# Patient Record
Sex: FEMALE | Race: WHITE | URBAN - METROPOLITAN AREA
[De-identification: names, ages, dates, MRNs, and addresses within clinical notes are randomized per-mention and may not be internally consistent; named-entity substitution may affect disease eponyms.]

---

## 2017-03-01 ENCOUNTER — MEDICAL CORRESPONDENCE (OUTPATIENT)
Dept: HEALTH INFORMATION MANAGEMENT | Facility: CLINIC | Age: 27
End: 2017-03-01

## 2017-03-09 ENCOUNTER — OFFICE VISIT (OUTPATIENT)
Dept: DERMATOLOGY | Facility: CLINIC | Age: 27
End: 2017-03-09
Payer: COMMERCIAL

## 2017-03-09 VITALS — HEART RATE: 78 BPM | SYSTOLIC BLOOD PRESSURE: 133 MMHG | DIASTOLIC BLOOD PRESSURE: 91 MMHG | OXYGEN SATURATION: 97 %

## 2017-03-09 DIAGNOSIS — L70.0 ACNE VULGARIS: Primary | ICD-10-CM

## 2017-03-09 PROCEDURE — 99203 OFFICE O/P NEW LOW 30 MIN: CPT | Performed by: PHYSICIAN ASSISTANT

## 2017-03-09 RX ORDER — ADAPALENE 0.1 G/100G
CREAM TOPICAL
Qty: 45 G | Refills: 11 | Status: SHIPPED | OUTPATIENT
Start: 2017-03-09 | End: 2017-08-09 | Stop reason: ALTCHOICE

## 2017-03-09 RX ORDER — CLINDAMYCIN PHOSPHATE 10 UG/ML
LOTION TOPICAL
Qty: 60 ML | Refills: 11 | Status: SHIPPED | OUTPATIENT
Start: 2017-03-09 | End: 2018-05-01

## 2017-03-09 RX ORDER — DOXYCYCLINE 100 MG/1
1 CAPSULE ORAL
Qty: 90 CAPSULE | Refills: 1 | Status: SHIPPED | OUTPATIENT
Start: 2017-03-09 | End: 2017-04-25

## 2017-03-09 NOTE — NURSING NOTE
"Chief Complaint   Patient presents with     Derm Problem     acne       Initial BP (!) 133/91  Pulse 78  SpO2 97% Estimated body mass index is 23.83 kg/(m^2) as calculated from the following:    Height as of 3/15/16: 1.638 m (5' 4.5\").    Weight as of 3/22/16: 64 kg (141 lb).  BP completed using cuff size: margarita Brito LPN    "

## 2017-03-09 NOTE — PATIENT INSTRUCTIONS
Treating acne is preventative.    May take 3-4 months to see 50% improvement.    May get worse during initial phase of treatment.    differin at bedtime (use pea sized amount to treat entire face) Dryness, irritation can     result, make sure to apply to dry face, and if too drying can use every other day.     Benzoyl peroxide wash daily or every other day depending on dryness.    Aggressive use of bland moisturizer such as Cerave or Cetaphil.    Apply clindamycin lotion twice daily to face.     Doxycycline 100mg twice daily for next two months then decrease once daily. Always take with food to avoid upset stomach, take at    least 30 minutes before you lay down.    These medications will make you Sun sensitive. Use sunscreen with UVA/UVB    protection with and SPF of 30 or above.    Recheck in 3-4 months.

## 2017-03-09 NOTE — MR AVS SNAPSHOT
After Visit Summary   3/9/2017    Vivian De La Torre    MRN: [id number]           Patient Information     Date Of Birth          1990        Visit Information        Provider Department      3/9/2017 3:40 PM Jennifer Hinton PA-C [facility]        Today's Diagnoses     Acne vulgaris    -  1      Care Instructions    Treating acne is preventative.    May take 3-4 months to see 50% improvement.    May get worse during initial phase of treatment.    differin at bedtime (use pea sized amount to treat entire face) Dryness, irritation can     result, make sure to apply to dry face, and if too drying can use every other day.     Benzoyl peroxide wash daily or every other day depending on dryness.    Aggressive use of bland moisturizer such as Cerave or Cetaphil.    Apply clindamycin lotion twice daily to face.     Doxycycline 100mg twice daily for next two months then decrease once daily. Always take with food to avoid upset stomach, take at    least 30 minutes before you lay down.    These medications will make you Sun sensitive. Use sunscreen with UVA/UVB    protection with and SPF of 30 or above.    Recheck in 3-4 months.               Follow-ups after your visit        Who to contact     If you have questions or need follow up information about today's clinic visit or your schedule please contact [facility] directly at [phone].  Normal or non-critical lab and imaging results will be communicated to you by MyChart, letter or phone within 4 business days after the clinic has received the results. If you do not hear from us within 7 days, please contact the clinic through MyChart or phone. If you have a critical or abnormal lab result, we will notify you by phone as soon as possible.  Submit refill requests through [employer] or call your pharmacy and they will forward the refill request to us. Please allow 3 business days for your refill to be completed.           "Additional Information About Your Visit        MyChart Information     [employer] lets you send messages to your doctor, view your test results, renew your prescriptions, schedule appointments and more. To sign up, go to [email or web address] . Click on \"Log in\" on the left side of the screen, which will take you to the Welcome page. Then click on \"Sign up Now\" on the right side of the page.     You will be asked to enter the access code listed below, as well as some personal information. Please follow the directions to create your username and password.     Your access code is: [email or web address]  Expires: 2017  4:11 PM     Your access code will  in 90 days. If you need help or a new code, please call your [facility] clinic or [phone].        Care EveryWhere ID     This is your Care EveryWhere ID. This could be used by other organizations to access your [facility] medical records  [id number]        Your Vitals Were     Pulse Pulse Oximetry                78 97%           Blood Pressure from Last 3 Encounters:   17 (!) 133/91   16 (!) 140/100   03/15/16 (!) 135/99    Weight from Last 3 Encounters:   16 64 kg (141 lb)   03/15/16 64 kg (141 lb)   05/20/15 72 kg (158 lb 12.8 oz)              Today, you had the following     No orders found for display         Today's Medication Changes          These changes are accurate as of: 3/9/17  4:11 PM.  If you have any questions, ask your nurse or doctor.               Start taking these medicines.        Dose/Directions    adapalene 0.1 % cream   Commonly known as:  DIFFERIN   Used for:  Acne vulgaris   Started by:  Jenniefr Hinton PA-C        Apply pea sized amount at bedtime.   Quantity:  45 g   Refills:  11       clindamycin 1 % lotion   Commonly known as:  CLINDAMAX   Used for:  Acne vulgaris   Started by:  Jennifer Hinton PA-C        Apply twice daily to face.   Quantity:  60 mL   Refills:  11       doxycycline Monohydrate 100 MG " Caps   Used for:  Acne vulgaris   Started by:  Jennifer Hinton PA-C        Dose:  1 capsule   Take 1 capsule (100 mg) by mouth daily with food   Quantity:  90 capsule   Refills:  1         Stop taking these medicines if you haven't already. Please contact your care team if you have questions.     acyclovir 400 MG tablet   Commonly known as:  ZOVIRAX   Stopped by:  Jennifer Hinton PA-C           indomethacin 50 MG capsule   Commonly known as:  INDOCIN   Stopped by:  Jennifer Hinton PA-C           norethindrone-ethinyl estradiol 1-35 MG-MCG per tablet   Commonly known as:  ORTHO-NOVUM 1-35 TAB,NORTREL 1-35 TAB   Stopped by:  Jennifer Hinton PA-C                Where to get your medicines      These medications were sent to [facility] [address]     Phone:  [phone]     adapalene 0.1 % cream    clindamycin 1 % lotion    doxycycline Monohydrate 100 MG Caps                Primary Care Provider    [facility] Other [facility]                Thank you!     Thank you for choosing [facility]  for your care. Our goal is always to provide you with excellent care. Hearing back from our patients is one way we can continue to improve our services. Please take a few minutes to complete the written survey that you may receive in the mail after your visit with us. Thank you!             Your Updated Medication List - Protect others around you: Learn how to safely use, store and throw away your medicines at www.disposemymeds.org.          This list is accurate as of: 3/9/17  4:11 PM.  Always use your most recent med list.                   Brand Name Dispense Instructions for use    adapalene 0.1 % cream    DIFFERIN    45 g    Apply pea sized amount at bedtime.       clindamycin 1 % lotion    CLINDAMAX    60 mL    Apply twice daily to face.       cyclobenzaprine 5 MG tablet    FLEXERIL    42 tablet    Take 1  tablet (5 mg) by mouth 3 times daily as needed for muscle spasms       doxycycline Monohydrate 100 MG Caps     90 capsule    Take 1 capsule (100 mg) by mouth daily with food       NEXPLANON SC

## 2017-03-10 NOTE — PROGRESS NOTES
Vivian De La Torre is a 26 year old year old female patient here today for acne .  Patient states this has been present for few years. Patient notes that she had acne when she was a teenager.  Patient has nexplanon as her birth control. She was recently on minocycline, benzaclin which did help clear her skin however she found the benzaclin was too drying. She denies any flaring with her cycle. She report that she will occasionally get cystic areas. Remainder of the HPI, Meds, PMH, Allergies, FH, and SH was reviewed in chart.    Pertinent Hx:  Acne vulgaris   Past Medical History   Diagnosis Date     ASCUS on Pap smear 8/15/11     HR HPV 66 and HPV 54     Chickenpox      History of colposcopy with cervical biopsy 9/9/11     benign       Past Surgical History   Procedure Laterality Date     No history of surgery          Family History   Problem Relation Age of Onset     Hypertension Father      HEART DISEASE Father      Respiratory Paternal Grandfather      Alcohol/Drug Maternal Grandfather        Social History     Social History     Marital status: Single     Spouse name: N/A     Number of children: N/A     Years of education: N/A     Occupational History     Not on file.     Social History Main Topics     Smoking status: Current Every Day Smoker     Packs/day: 0.10     Last attempt to quit: 2/1/2013     Smokeless tobacco: Never Used      Comment: trying to quit again- 5-20-15     Alcohol use No     Drug use: No     Sexual activity: Yes     Partners: Male     Birth control/ protection: OCP, Pill     Other Topics Concern     Not on file     Social History Narrative       Outpatient Encounter Prescriptions as of 3/9/2017   Medication Sig Dispense Refill     Etonogestrel (NEXPLANON SC)        doxycycline Monohydrate 100 MG CAPS Take 1 capsule (100 mg) by mouth daily with food 90 capsule 1     adapalene (DIFFERIN) 0.1 % cream Apply pea sized amount at bedtime. 45 g 11     clindamycin (CLINDAMAX) 1 % lotion Apply twice  daily to face. 60 mL 11     [DISCONTINUED] norethindrone-ethinyl estradiol (ORTHO-NOVUM 1-35 TAB,NORTREL 1-35 TAB) 1-35 MG-MCG per tablet Take 1 tablet by mouth daily continous for dysmenorrhea 112 tablet 3     [DISCONTINUED] acyclovir (ZOVIRAX) 400 MG tablet Take 1 tablet (400 mg) by mouth 3 times daily Uses only for outbreaks 21 tablet 6     cyclobenzaprine (FLEXERIL) 5 MG tablet Take 1 tablet (5 mg) by mouth 3 times daily as needed for muscle spasms (Patient not taking: Reported on 3/9/2017) 42 tablet 0     [DISCONTINUED] indomethacin (INDOCIN) 50 MG capsule Take 1 capsule (50 mg) by mouth 3 times daily (with meals) 42 capsule 1     No facility-administered encounter medications on file as of 3/9/2017.              Review Of Systems  Skin: As above  Eyes: negative  Ears/Nose/Throat: negative  Respiratory: No shortness of breath, dyspnea on exertion, cough, or hemoptysis  Cardiovascular: negative  Gastrointestinal: negative  Genitourinary: negative  Musculoskeletal: negative  Neurologic: negative  Psychiatric: negative  Hematologic/Lymphatic/Immunologic: negative  Endocrine: negative      O:   NAD, WDWN, Alert & Oriented, Mood & Affect wnl, Vitals stable   Here today alone   BP (!) 133/91  Pulse 78  SpO2 97%   General appearance normal   Vitals stable   Alert, oriented and in no acute distress      Comedones on face, rare inflammatory papules     Eyes: Conjunctivae/lids:Normal     ENT: Lips    MSK:Normal    Pulm: Breathing Normal    Neuro/Psych: Orientation:Normal; Mood/Affect:Normal  A/P:  1. Acne Vulgaris   Treating acne is preventative.    May take 3-4 months to see 50% improvement.    May get worse during initial phase of treatment.    differin at bedtime (use pea sized amount to treat entire face) Dryness, irritation can     result, make sure to apply to dry face, and if too drying can use every other day.     Benzoyl peroxide wash daily or every other day depending on dryness.    Aggressive use of bland  moisturizer such as Cerave or Cetaphil.    Apply clindamycin lotion twice daily to face.     Doxycycline 100mg twice daily for next two months then decrease once daily. Always take with food to avoid upset stomach, take at    least 30 minutes before you lay down.    These medications will make you Sun sensitive. Use sunscreen with UVA/UVB    protection with and SPF of 30 or above.    Recheck in 3-4 months.

## 2017-04-25 ENCOUNTER — TELEPHONE (OUTPATIENT)
Dept: DERMATOLOGY | Facility: CLINIC | Age: 27
End: 2017-04-25

## 2017-04-25 DIAGNOSIS — L70.0 ACNE VULGARIS: ICD-10-CM

## 2017-04-25 RX ORDER — DOXYCYCLINE 100 MG/1
CAPSULE ORAL
Qty: 60 CAPSULE | Refills: 2 | Status: SHIPPED | OUTPATIENT
Start: 2017-04-25 | End: 2017-08-09 | Stop reason: ALTCHOICE

## 2017-04-25 NOTE — TELEPHONE ENCOUNTER
Rx sent to pharmacy to reflect 3-9-17 Dermatology office visit dictation:     Doxycycline 100mg twice daily for next two months then decrease once daily. Always take with food to avoid upset stomach, take at     least 30 minutes before you lay down.    Carmita Ag RN

## 2017-04-25 NOTE — TELEPHONE ENCOUNTER
Reason for Call:  Other prescription    Detailed comments: calling in for new rx to reflect increase in dose per patient request on the doxycycline    Phone Number Patient can be reached at: NA    Best Time: NA    Can we leave a detailed message on this number? Not Applicable    Call taken on 4/25/2017 at 9:29 AM by Renita Mckeon

## 2017-06-28 ENCOUNTER — TELEPHONE (OUTPATIENT)
Dept: DERMATOLOGY | Facility: CLINIC | Age: 27
End: 2017-06-28

## 2017-06-28 DIAGNOSIS — L70.0 ACNE VULGARIS: Primary | ICD-10-CM

## 2017-06-28 NOTE — TELEPHONE ENCOUNTER
"Last seen for Acne 3-9-17 and was to return in 3-4 months. Has no follow up appointment scheduled.     \"I was taking the pills twice a day, then I got switched to once a day, but I am still getting new pimples and my face is oily 24/7. It is not clearing up. Is there something that I can try?..\"     Advised to schedule a follow up appointment as she was to be seen in 3-4 months, but we are booking appointments into 1st week of August, so asked to schedule follow up and will send to Provider for further advice.     Uses [facility] pharmacy.     Carmita Ag RN        "

## 2017-06-28 NOTE — TELEPHONE ENCOUNTER
Reason for Call:  Patient is calling in states that medication regimen is not working    Detailed comments: please advise recommendations    Phone Number Patient can be reached at: Home number on file [phone] (home)    Best Time: any    Can we leave a detailed message on this number? YES    Call taken on 6/28/2017 at 1:46 PM by Renita Mckeon

## 2017-06-28 NOTE — TELEPHONE ENCOUNTER
She really does need a f/u appt to discuss all of the options. I know we are booking far out, so can offer a different antibiotic in the mean time. I would do ampicillin twice per day for the next 1-2 months. I will pend if she would like to try.   Antibiotics are more of a band-aid and don't necessarily cure anything, so even if she is clear, I would still f/u.

## 2017-06-29 RX ORDER — AMPICILLIN TRIHYDRATE 500 MG
CAPSULE ORAL
Qty: 60 CAPSULE | Refills: 1 | Status: SHIPPED | OUTPATIENT
Start: 2017-06-29 | End: 2017-08-09 | Stop reason: ALTCHOICE

## 2017-06-29 NOTE — TELEPHONE ENCOUNTER
Pt returned call and would like to try the ampicillin - please send in prescription.     Should she continue using the cream and lotion she is on, or stop that once she is on the antibiotic? - Please advise

## 2017-06-29 NOTE — TELEPHONE ENCOUNTER
Prescription sent to pharmacy. Please see note below and advise on topicals.  Lissett VICTORIA RN BSN PHN  Specialty Clinics

## 2017-06-29 NOTE — TELEPHONE ENCOUNTER
Left message for pt to call back and advise if she would like to try the ampicillin.  Medication pended per provider. Advised to keep upcoming appt.  Lissett VICTORIA RN BSN PHN  Specialty Clinics

## 2017-08-08 ENCOUNTER — OFFICE VISIT (OUTPATIENT)
Dept: DERMATOLOGY | Facility: CLINIC | Age: 27
End: 2017-08-08
Payer: COMMERCIAL

## 2017-08-08 VITALS — HEART RATE: 80 BPM | OXYGEN SATURATION: 100 % | DIASTOLIC BLOOD PRESSURE: 82 MMHG | SYSTOLIC BLOOD PRESSURE: 129 MMHG

## 2017-08-08 DIAGNOSIS — L70.0 ACNE VULGARIS: Primary | ICD-10-CM

## 2017-08-08 LAB
ANION GAP SERPL CALCULATED.3IONS-SCNC: 4 MMOL/L (ref 3–14)
BUN SERPL-MCNC: 13 MG/DL (ref 7–30)
CALCIUM SERPL-MCNC: 8.9 MG/DL (ref 8.5–10.1)
CHLORIDE SERPL-SCNC: 108 MMOL/L (ref 94–109)
CO2 SERPL-SCNC: 29 MMOL/L (ref 20–32)
CREAT SERPL-MCNC: 0.7 MG/DL (ref 0.52–1.04)
GFR SERPL CREATININE-BSD FRML MDRD: NORMAL ML/MIN/1.7M2
GLUCOSE SERPL-MCNC: 94 MG/DL (ref 70–99)
POTASSIUM SERPL-SCNC: 4 MMOL/L (ref 3.4–5.3)
SODIUM SERPL-SCNC: 141 MMOL/L (ref 133–144)

## 2017-08-08 PROCEDURE — 80048 BASIC METABOLIC PNL TOTAL CA: CPT | Performed by: PHYSICIAN ASSISTANT

## 2017-08-08 PROCEDURE — 36415 COLL VENOUS BLD VENIPUNCTURE: CPT | Performed by: PHYSICIAN ASSISTANT

## 2017-08-08 PROCEDURE — 99213 OFFICE O/P EST LOW 20 MIN: CPT | Performed by: PHYSICIAN ASSISTANT

## 2017-08-08 RX ORDER — TRETINOIN 0.5 MG/G
CREAM TOPICAL
Qty: 45 G | Refills: 11 | Status: SHIPPED | OUTPATIENT
Start: 2017-08-08 | End: 2018-01-15 | Stop reason: ALTCHOICE

## 2017-08-08 RX ORDER — SPIRONOLACTONE 100 MG/1
100 TABLET, FILM COATED ORAL DAILY
Qty: 90 TABLET | Refills: 1 | Status: SHIPPED | OUTPATIENT
Start: 2017-08-08 | End: 2018-01-15 | Stop reason: ALTCHOICE

## 2017-08-08 NOTE — MR AVS SNAPSHOT
"              After Visit Summary   2017    Vivian De La Torre    MRN: [id number]           Patient Information     Date Of Birth          1990        Visit Information        Provider Department      2017 3:40 PM Jennifer Hinton PA-C [facility]        Today's Diagnoses     Acne vulgaris    -  1      Care Instructions    Use benzoyl peroxide 10% wash- OTC  Apply tretinoin 0.05% cream at bedtime.   Check blood work today.   Start Spironolactone 100 mg daily.           Follow-ups after your visit        Who to contact     If you have questions or need follow up information about today's clinic visit or your schedule please contact [facility] directly at [phone].  Normal or non-critical lab and imaging results will be communicated to you by [employer] software[employer], letter or phone within 4 business days after the clinic has received the results. If you do not hear from us within 7 days, please contact the clinic through [employer] software[employer] or phone. If you have a critical or abnormal lab result, we will notify you by phone as soon as possible.  Submit refill requests through [employer] or call your pharmacy and they will forward the refill request to us. Please allow 3 business days for your refill to be completed.          Additional Information About Your Visit        [employer] software[employer] Information     [employer] lets you send messages to your doctor, view your test results, renew your prescriptions, schedule appointments and more. To sign up, go to [email or web address] . Click on \"Log in\" on the left side of the screen, which will take you to the Welcome page. Then click on \"Sign up Now\" on the right side of the page.     You will be asked to enter the access code listed below, as well as some personal information. Please follow the directions to create your username and password.     Your access code is: [email or web address]  Expires: 2017  3:43 PM     Your access code will  in 90 days. If you need " help or a new code, please call your [place] clinic or [phone].        Care EveryWhere ID     This is your Care EveryWhere ID. This could be used by other organizations to access your [facility] medical records  [id number]        Your Vitals Were     Pulse Pulse Oximetry                80 100%           Blood Pressure from Last 3 Encounters:   08/08/17 129/82   03/09/17 (!) 133/91   03/22/16 (!) 140/100    Weight from Last 3 Encounters:   03/22/16 64 kg (141 lb)   03/15/16 64 kg (141 lb)   05/20/15 72 kg (158 lb 12.8 oz)              We Performed the Following     Basic metabolic panel  (Ca, Cl, CO2, Creat, Gluc, K, Na, BUN)          Today's Medication Changes          These changes are accurate as of: 8/8/17  3:43 PM.  If you have any questions, ask your nurse or doctor.               Start taking these medicines.        Dose/Directions    spironolactone 100 MG tablet   Commonly known as:  ALDACTONE   Used for:  Acne vulgaris   Started by:  Jennifer Hinton PA-C        Dose:  100 mg   Take 1 tablet (100 mg) by mouth daily   Quantity:  90 tablet   Refills:  1       tretinoin 0.05 % cream   Commonly known as:  RETIN-A   Used for:  Acne vulgaris   Started by:  Jennifer Hinton PA-C        Spread a pea size amount into affected area topically at bedtime.  Use sunscreen SPF>20.   Quantity:  45 g   Refills:  11            Where to get your medicines      These medications were sent to [facility]-[address] [address]     Phone:  [phone]     spironolactone 100 MG tablet    tretinoin 0.05 % cream                Primary Care Provider    [facility] Other Clinic                Equal Access to Services     RENITA BAJWA AH: Amor Johnston, roger atkinson, lincoln blairalgunjan leyva, pallavi bonilla. So [facility] [phone].    ATENCIÓN: Si habla español, tiene a sykes disposición servicios gratuitos  de asistencia lingüística. Lyssa cardenas [phone].    We comply with applicable federal civil rights laws and [place] laws. We do not discriminate on the basis of race, color, national origin, age, disability sex, sexual orientation or gender identity.            Thank you!     Thank you for choosing [facility]  for your care. Our goal is always to provide you with excellent care. Hearing back from our patients is one way we can continue to improve our services. Please take a few minutes to complete the written survey that you may receive in the mail after your visit with us. Thank you!             Your Updated Medication List - Protect others around you: Learn how to safely use, store and throw away your medicines at www.disposemymeds.org.          This list is accurate as of: 8/8/17  3:43 PM.  Always use your most recent med list.                   Brand Name Dispense Instructions for use Diagnosis    adapalene 0.1 % cream    DIFFERIN    45 g    Apply pea sized amount at bedtime.    Acne vulgaris       ampicillin 500 MG capsule    PRINCIPEN    60 capsule    1 tab PO BID    Acne vulgaris       clindamycin 1 % lotion    CLINDAMAX    60 mL    Apply twice daily to face.    Acne vulgaris       cyclobenzaprine 5 MG tablet    FLEXERIL    42 tablet    Take 1 tablet (5 mg) by mouth 3 times daily as needed for muscle spasms    SI (sacroiliac) joint dysfunction       doxycycline Monohydrate 100 MG Caps     60 capsule    Take 1 capsule twice daily with food for 2 months, then decrease to once daily, Then be seen in 3 months for recheck.    Acne vulgaris       NEXPLANON SC           spironolactone 100 MG tablet    ALDACTONE    90 tablet    Take 1 tablet (100 mg) by mouth daily    Acne vulgaris       tretinoin 0.05 % cream    RETIN-A    45 g    Spread a pea size amount into affected area topically at bedtime.  Use sunscreen SPF>20.    Acne vulgaris

## 2017-08-08 NOTE — PATIENT INSTRUCTIONS
Use benzoyl peroxide 10% wash- OTC  Apply tretinoin 0.05% cream at bedtime.   Check blood work today.   Start Spironolactone 100 mg daily.

## 2017-08-08 NOTE — NURSING NOTE
Chief Complaint   Patient presents with     Acne       Vitals:    08/08/17 1530   BP: 129/82   Pulse: 80   SpO2: 100%     Wt Readings from Last 1 Encounters:   03/22/16 64 kg (141 lb)       Abbie Hatfield LPN.................8/8/2017

## 2017-08-09 NOTE — PROGRESS NOTES
Vivian De La Torre is a 26 year old year old female patient here today for recheck acne.  Patient reports that her skin did not see much improvements on doxycycline twice daily or ampicillin. She is also on clindamycin lotion, bpo wash, and differin. She notes that she will continue have flaring on regimen. She reports she will get cystic areas around jaw line. She has her sister's wedding in September and would like to have clearer skin.  Remainder of the HPI, Meds, PMH, Allergies, FH, and SH was reviewed in chart.   Past Medical History:   Diagnosis Date     ASCUS on Pap smear 8/15/11    HR HPV 66 and HPV 54     Chickenpox      History of colposcopy with cervical biopsy 9/9/11    benign       Past Surgical History:   Procedure Laterality Date     NO HISTORY OF SURGERY          Family History   Problem Relation Age of Onset     Hypertension Father      HEART DISEASE Father      Respiratory Paternal Grandfather      Alcohol/Drug Maternal Grandfather        Social History     Social History     Marital status: Single     Spouse name: N/A     Number of children: N/A     Years of education: N/A     Occupational History     Not on file.     Social History Main Topics     Smoking status: Current Every Day Smoker     Packs/day: 0.10     Last attempt to quit: 2/1/2013     Smokeless tobacco: Never Used      Comment: trying to quit again- 5-20-15     Alcohol use No     Drug use: No     Sexual activity: Yes     Partners: Male     Birth control/ protection: OCP, Pill     Other Topics Concern     Not on file     Social History Narrative       Outpatient Encounter Prescriptions as of 8/8/2017   Medication Sig Dispense Refill     spironolactone (ALDACTONE) 100 MG tablet Take 1 tablet (100 mg) by mouth daily 90 tablet 1     tretinoin (RETIN-A) 0.05 % cream Spread a pea size amount into affected area topically at bedtime.  Use sunscreen SPF>20. 45 g 11     ampicillin (PRINCIPEN) 500 MG capsule 1 tab PO BID 60 capsule 1      Etonogestrel (NEXPLANON SC)        adapalene (DIFFERIN) 0.1 % cream Apply pea sized amount at bedtime. 45 g 11     clindamycin (CLINDAMAX) 1 % lotion Apply twice daily to face. 60 mL 11     doxycycline Monohydrate 100 MG CAPS Take 1 capsule twice daily with food for 2 months, then decrease to once daily, Then be seen in 3 months for recheck. (Patient not taking: Reported on 8/8/2017) 60 capsule 2     cyclobenzaprine (FLEXERIL) 5 MG tablet Take 1 tablet (5 mg) by mouth 3 times daily as needed for muscle spasms (Patient not taking: Reported on 3/9/2017) 42 tablet 0     No facility-administered encounter medications on file as of 8/8/2017.              Review Of Systems  Skin: As above  Eyes: negative  Ears/Nose/Throat: negative  Respiratory: No shortness of breath, dyspnea on exertion, cough, or hemoptysis  Cardiovascular: negative  Gastrointestinal: negative  Genitourinary: negative  Musculoskeletal: negative  Neurologic: negative  Psychiatric: negative  Hematologic/Lymphatic/Immunologic: negative  Endocrine: negative      O:   NAD, WDWN, Alert & Oriented, Mood & Affect wnl, Vitals stable   Here today alone   /82  Pulse 80  SpO2 100%   General appearance normal   Vitals stable   Alert, oriented and in no acute distress      Rare inflammatory papules, red macules       Eyes: Conjunctivae/lids:Normal     ENT: Lips    MSK:Normal    Pulm: Breathing Normal    Neuro/Psych: Orientation:Normal; Mood/Affect:Normal  A/P:  1. Acne Vulgaris  Consistent with adult female acne.   Patient has nexplanon for birth control.   Discussed spironolactone.   Discussed potential side effects: increased urination, breast tenderness, increase potassium, decreasing blood pressure, and birth defects.   Patient would like to try spironolactone.   Increase to 10% bpo wash and tretinoin 0.05% cream.   Will check BMP today.   Return in 3 months.

## 2017-09-15 DIAGNOSIS — B00.9 HSV-2 INFECTION: ICD-10-CM

## 2017-09-15 RX ORDER — ACYCLOVIR 400 MG/1
TABLET ORAL
Qty: 21 TABLET | Refills: 0 | Status: SHIPPED | OUTPATIENT
Start: 2017-09-15 | End: 2018-02-07

## 2017-10-09 ENCOUNTER — TELEPHONE (OUTPATIENT)
Dept: DERMATOLOGY | Facility: CLINIC | Age: 27
End: 2017-10-09

## 2017-10-09 NOTE — TELEPHONE ENCOUNTER
Reason for Call:  Other     Detailed comments: Pt has been taking spironolactone for 2 months as directed and feels condition is not getting any better. Is there something else she can try? She does not have a followup appt scheduled until next month. - Please advise    Phone Number Patient can be reached at: Home number on file [phone] (home)    Best Time: Any    Can we leave a detailed message on this number? YES    Call taken on 10/9/2017 at 3:21 PM by Denise Behrendt

## 2017-10-10 NOTE — TELEPHONE ENCOUNTER
I would keep her appointment if we are not seeing improvement we can discuss accutane or isotretinoin. Hopefully within the next month we will see more improvements if not then she will have failed antibiotics and spironolactone and the next best option would be isotretinoin.

## 2017-10-10 NOTE — TELEPHONE ENCOUNTER
Patient notified. Patient verbalized understanding. She will keep scheduled follow up appointment.  Carmita Ag RN

## 2017-10-10 NOTE — TELEPHONE ENCOUNTER
"Spoke to patient.     \"I have been taking the Spironolactone and using the Benzoyl peroxide wash in the morning and the Tretinoin cream at night, but my skin really doesn't look any different from when she saw me..\"     I asked if she was getting new pimples, and she replied: \"yes\"    Uses [facility] pharmacy.     Has a 11-7-17 follow up appointment scheduled, should she keep that appointment or push it out?     Please advise. Carmita Ag RN      "

## 2017-11-07 ENCOUNTER — OFFICE VISIT (OUTPATIENT)
Dept: DERMATOLOGY | Facility: CLINIC | Age: 27
End: 2017-11-07
Payer: COMMERCIAL

## 2017-11-07 VITALS — SYSTOLIC BLOOD PRESSURE: 146 MMHG | OXYGEN SATURATION: 98 % | DIASTOLIC BLOOD PRESSURE: 86 MMHG | HEART RATE: 79 BPM

## 2017-11-07 DIAGNOSIS — L70.0 ACNE VULGARIS: Primary | ICD-10-CM

## 2017-11-07 LAB — BETA HCG QUAL IFA URINE: NEGATIVE

## 2017-11-07 PROCEDURE — 99214 OFFICE O/P EST MOD 30 MIN: CPT | Performed by: PHYSICIAN ASSISTANT

## 2017-11-07 PROCEDURE — 84703 CHORIONIC GONADOTROPIN ASSAY: CPT | Performed by: PHYSICIAN ASSISTANT

## 2017-11-07 NOTE — MR AVS SNAPSHOT
After Visit Summary   11/7/2017    Vivian De La Torre    MRN: [id number]           Patient Information     Date Of Birth          1990        Visit Information        Provider Department      11/7/2017 4:20 PM Jennifer Hinton PA-C [facility]        Today's Diagnoses     Acne vulgaris    -  1       Follow-ups after your visit        Your next 10 appointments already scheduled     Dec 08, 2017 11:40 AM CST   Return Visit with Jennifer Hinton PA-C   [facility]    [address] [address]   [phone]            Jan 09, 2018  3:40 PM CST   Return Visit with Jennifer Hinton PA-C   [facility]    [address] [address]   [phone]            Feb 06, 2018  3:40 PM CST   Return Visit with Jennifer Hinton PA-C   [facility]    [address] [address]   [phone]            Mar 06, 2018  3:40 PM CST   Return Visit with Jennifer Hinton PA-C   [facility] ([facility]    [address] [address]   [phone]              Who to contact     If you have questions or need follow up information about today's clinic visit or your schedule please contact [facility] directly at [phone].  Normal or non-critical lab and imaging results will be communicated to you by MyChart, letter or phone within 4 business days after the clinic has received the results. If you do not hear from us within 7 days, please contact the clinic through MyChart or phone. If you have a critical or abnormal lab result, we will notify you by phone as soon as possible.  Submit refill requests through [employer] or call your pharmacy and they will forward the refill request to us. Please allow 3 business days for your refill to be  "completed.          Additional Information About Your Visit        [employer]katter[employer]adia Information     [employer] lets you send messages to your doctor, view your test results, renew your prescriptions, schedule appointments and more. To sign up, go to [email or web address] . Click on \"Log in\" on the left side of the screen, which will take you to the Welcome page. Then click on \"Sign up Now\" on the right side of the page.     You will be asked to enter the access code listed below, as well as some personal information. Please follow the directions to create your username and password.     Your access code is: [email or web address]  Expires: 2018  7:46 AM     Your access code will  in 90 days. If you need help or a new code, please call your [facility] clinic or [phone].        Care EveryWhere ID     This is your Care EveryWhere ID. This could be used by other organizations to access your [facility] medical records  [id number]        Your Vitals Were     Pulse Pulse Oximetry                79 98%           Blood Pressure from Last 3 Encounters:   17 146/86   17 129/82   17 (!) 133/91    Weight from Last 3 Encounters:   16 64 kg (141 lb)   03/15/16 64 kg (141 lb)   05/20/15 72 kg (158 lb 12.8 oz)              We Performed the Following     Beta HCG qual IFA urine - [facility]        Primary Care Provider    Physician No Ref-Primary       NO REF-PRIMARY PHYSICIAN        Equal Access to Services     MARTIN BAJWA : Hadii aad ku hadasho Soomaali, waaxda luqadaha, qaybta kaalmada adeegyada, pallavi de souaz . So [facility] [phone].    ATENCIÓN: Si habla español, tiene a sykes disposición servicios gratuitos de asistencia lingüística. Llame al [phone].    We comply with applicable federal civil rights laws and [place] laws. We do not discriminate on the basis of race, color, national origin, age, disability, sex, sexual orientation, or gender identity.            Thank " you!     Thank you for choosing [facility]  for your care. Our goal is always to provide you with excellent care. Hearing back from our patients is one way we can continue to improve our services. Please take a few minutes to complete the written survey that you may receive in the mail after your visit with us. Thank you!             Your Updated Medication List - Protect others around you: Learn how to safely use, store and throw away your medicines at www.disposemymeds.org.          This list is accurate as of: 11/7/17 11:59 PM.  Always use your most recent med list.                   Brand Name Dispense Instructions for use Diagnosis    acyclovir 400 MG tablet    ZOVIRAX    21 tablet    Take 1 tablet (400 mg) by mouth 3 times daily Uses only for outbreaks    HSV-2 infection       clindamycin 1 % lotion    CLINDAMAX    60 mL    Apply twice daily to face.    Acne vulgaris       cyclobenzaprine 5 MG tablet    FLEXERIL    42 tablet    Take 1 tablet (5 mg) by mouth 3 times daily as needed for muscle spasms    SI (sacroiliac) joint dysfunction       NEXPLANON SC           spironolactone 100 MG tablet    ALDACTONE    90 tablet    Take 1 tablet (100 mg) by mouth daily    Acne vulgaris       tretinoin 0.05 % cream    RETIN-A    45 g    Spread a pea size amount into affected area topically at bedtime.  Use sunscreen SPF>20.    Acne vulgaris

## 2017-11-07 NOTE — NURSING NOTE
"Chief Complaint   Patient presents with     Derm Problem     acne       Initial /86  Pulse 79  SpO2 98% Estimated body mass index is 23.83 kg/(m^2) as calculated from the following:    Height as of 3/15/16: 1.638 m (5' 4.5\").    Weight as of 3/22/16: 64 kg (141 lb).  BP completed using cuff size: margarita Brito LPN    "

## 2017-11-07 NOTE — LETTER
11/7/2017         RE: Vivian De La Torre  [address] [address]        Dear Colleague,    Thank you for referring your patient, Vivian De La Torre, to the [facility]. Please see a copy of my visit note below.    Vivian De La Torre is a 27 year old year old female patient here today for recheck acne vulgaris. She would like to start isotretinoin. Patient reports that her skin did not see much improvements on doxycycline twice daily or ampicillin. She is also on clindamycin lotion, bpo wash, and differin. She notes that she will continue have flaring on regimen. She mostly recently tried spironolactone with little improvement. She reports she will get cystic areas around jaw line.   Patient has no other skin complaints today.  Remainder of the HPI, Meds, PMH, Allergies, FH, and SH was reviewed in chart.    Pertinent Hx:   Acne vulgaris   Past Medical History:   Diagnosis Date     ASCUS on Pap smear 8/15/11    HR HPV 66 and HPV 54     Chickenpox      History of colposcopy with cervical biopsy 9/9/11    benign       Past Surgical History:   Procedure Laterality Date     NO HISTORY OF SURGERY          Family History   Problem Relation Age of Onset     Hypertension Father      HEART DISEASE Father      Respiratory Paternal Grandfather      Alcohol/Drug Maternal Grandfather        Social History     Social History     Marital status: Single     Spouse name: N/A     Number of children: N/A     Years of education: N/A     Occupational History     Not on file.     Social History Main Topics     Smoking status: Current Every Day Smoker     Packs/day: 0.10     Last attempt to quit: 2/1/2013     Smokeless tobacco: Never Used      Comment: trying to quit again- 5-20-15     Alcohol use No     Drug use: No     Sexual activity: Yes     Partners: Male     Birth control/ protection: OCP, Pill     Other Topics Concern     Not on file     Social History Narrative       Outpatient Encounter  Prescriptions as of 11/7/2017   Medication Sig Dispense Refill     acyclovir (ZOVIRAX) 400 MG tablet Take 1 tablet (400 mg) by mouth 3 times daily Uses only for outbreaks 21 tablet 0     spironolactone (ALDACTONE) 100 MG tablet Take 1 tablet (100 mg) by mouth daily 90 tablet 1     tretinoin (RETIN-A) 0.05 % cream Spread a pea size amount into affected area topically at bedtime.  Use sunscreen SPF>20. 45 g 11     Etonogestrel (NEXPLANON SC)        clindamycin (CLINDAMAX) 1 % lotion Apply twice daily to face. 60 mL 11     cyclobenzaprine (FLEXERIL) 5 MG tablet Take 1 tablet (5 mg) by mouth 3 times daily as needed for muscle spasms 42 tablet 0     No facility-administered encounter medications on file as of 11/7/2017.              Review Of Systems  Skin: As above  Eyes: negative  Ears/Nose/Throat: negative  Respiratory: No shortness of breath, dyspnea on exertion, cough, or hemoptysis  Cardiovascular: negative  Gastrointestinal: negative  Genitourinary: negative  Musculoskeletal: negative  Neurologic: negative  Psychiatric: negative  Hematologic/Lymphatic/Immunologic: negative  Endocrine: negative      O:   NAD, WDWN, Alert & Oriented, Mood & Affect wnl, Vitals stable   Here today alone   /86  Pulse 79  SpO2 98%   General appearance normal   Vitals stable   Alert, oriented and in no acute distress     2+ inflammatory papules, comedones, and few inflammatory cystic lesions on face     Eyes: Conjunctivae/lids:Normal     ENT: Lips    MSK:Normal    Pulm: Breathing Normal    Neuro/Psych: Orientation:Normal; Mood/Affect:Normal  A/P:  1. Acne Vulgaris   Can continue current treatment until next office visit when we start isotretinoin.   Standing CBC, CMP and fasting lipids  Ipledge reviewed with patient and Ipledge consent form complete  Patient place in ipledge system  Contraception: 1. Nexplanon 2. Partner's vasectomy   Ipledge: [phone]  Return to clinic 30 days  Dry lips and mouth, minor swelling of the eyelids  or lips, crusty skin, nosebleeds, GI upset, or thinning of hair may occur. If any of these effects persist or worsen, tell your doctor or pharmacist promptly.   To relieve dry mouth, suck on (sugarless) hard candy or ice chips, chew (sugarless) gum, drink water.   Remember that your doctor has prescribed this medication because he or she has judged that the benefit to you is greater than the risk of side effects. Many people using this medication do not have serious side effects.   Contact office immediately if you have any of these unlikely but serious side effects: mental/mood changes (e.g., depression,  aggressive or violent behavior, and in rare cases, thoughts of suicide), tingling feeling in the skin, quick/severe sun sensitivity, back/joint/muscle pain, signs of infection (e.g., fever, persistent sore throat, painful swallowing, peeling skin on palms/soles.   Isotretinoin may infrequently cause disease of the pancreatitis, that may rarely be fatal. Stop taking this medication and contact office immediately if you develop: severe stomach pain severe or persistent GI upset,   Stop taking this medication and tell your doctor immediately if you develop these unlikely but very serious side effects: severe headache, vision changes, ear ringing, hearling loss, chest pain, yellowing eyes, skin, dark urine, severe diarrhea, rectal bleeding,   Seek immediate medical attention if you notice any symptoms of a serious allergic reaction.    Accutane is discussed fully with the patient. It is a very effective drug to treat acne vulgaris but has many potential significant side effects. Chief among these are teratogensis, hepatic injury, dyslipidemia and severe drying of the mucous membranes. All of these issues have been discussed in details. Monthly blood tests to monitor lipids and liver functions will be necessary. Expect painful dryness and/or fissuring around the lips, eyes, and other moist areas of the body. Balms may  be protective. Contact lens may be too painful to wear temporarily while on this drug. Episodes of significant depression have been reported, including suicidal ideation and attempts in rare cases. It may also cause pseudotumor cerebri and hyperostosis. The patient will report any such changes in mood, depressive symptoms or suicidal thoughts, headaches, joint or bone pains. There is also a possible association with inflammatory bowel disease, although this is unproven at this point.         Again, thank you for allowing me to participate in the care of your patient.        Sincerely,        Jennifer Casiano PA-C

## 2017-11-08 NOTE — PROGRESS NOTES
Vivian De La Torre is a 27 year old year old female patient here today for recheck acne vulgaris. She would like to start isotretinoin. Patient reports that her skin did not see much improvements on doxycycline twice daily or ampicillin. She is also on clindamycin lotion, bpo wash, and differin. She notes that she will continue have flaring on regimen. She mostly recently tried spironolactone with little improvement. She reports she will get cystic areas around jaw line.   Patient has no other skin complaints today.  Remainder of the HPI, Meds, PMH, Allergies, FH, and SH was reviewed in chart.    Pertinent Hx:   Acne vulgaris   Past Medical History:   Diagnosis Date     ASCUS on Pap smear 8/15/11    HR HPV 66 and HPV 54     Chickenpox      History of colposcopy with cervical biopsy 9/9/11    benign       Past Surgical History:   Procedure Laterality Date     NO HISTORY OF SURGERY          Family History   Problem Relation Age of Onset     Hypertension Father      HEART DISEASE Father      Respiratory Paternal Grandfather      Alcohol/Drug Maternal Grandfather        Social History     Social History     Marital status: Single     Spouse name: N/A     Number of children: N/A     Years of education: N/A     Occupational History     Not on file.     Social History Main Topics     Smoking status: Current Every Day Smoker     Packs/day: 0.10     Last attempt to quit: 2/1/2013     Smokeless tobacco: Never Used      Comment: trying to quit again- 5-20-15     Alcohol use No     Drug use: No     Sexual activity: Yes     Partners: Male     Birth control/ protection: OCP, Pill     Other Topics Concern     Not on file     Social History Narrative       Outpatient Encounter Prescriptions as of 11/7/2017   Medication Sig Dispense Refill     acyclovir (ZOVIRAX) 400 MG tablet Take 1 tablet (400 mg) by mouth 3 times daily Uses only for outbreaks 21 tablet 0     spironolactone (ALDACTONE) 100 MG tablet Take 1 tablet (100 mg) by mouth  daily 90 tablet 1     tretinoin (RETIN-A) 0.05 % cream Spread a pea size amount into affected area topically at bedtime.  Use sunscreen SPF>20. 45 g 11     Etonogestrel (NEXPLANON SC)        clindamycin (CLINDAMAX) 1 % lotion Apply twice daily to face. 60 mL 11     cyclobenzaprine (FLEXERIL) 5 MG tablet Take 1 tablet (5 mg) by mouth 3 times daily as needed for muscle spasms 42 tablet 0     No facility-administered encounter medications on file as of 11/7/2017.              Review Of Systems  Skin: As above  Eyes: negative  Ears/Nose/Throat: negative  Respiratory: No shortness of breath, dyspnea on exertion, cough, or hemoptysis  Cardiovascular: negative  Gastrointestinal: negative  Genitourinary: negative  Musculoskeletal: negative  Neurologic: negative  Psychiatric: negative  Hematologic/Lymphatic/Immunologic: negative  Endocrine: negative      O:   NAD, WDWN, Alert & Oriented, Mood & Affect wnl, Vitals stable   Here today alone   /86  Pulse 79  SpO2 98%   General appearance normal   Vitals stable   Alert, oriented and in no acute distress     2+ inflammatory papules, comedones, and few inflammatory cystic lesions on face     Eyes: Conjunctivae/lids:Normal     ENT: Lips    MSK:Normal    Pulm: Breathing Normal    Neuro/Psych: Orientation:Normal; Mood/Affect:Normal  A/P:  1. Acne Vulgaris   Can continue current treatment until next office visit when we start isotretinoin.   Standing CBC, CMP and fasting lipids  Ipledge reviewed with patient and Ipledge consent form complete  Patient place in ipledge system  Contraception: 1. Nexplanon 2. Partner's vasectomy   Ipledge: [phone]  Return to clinic 30 days  Dry lips and mouth, minor swelling of the eyelids or lips, crusty skin, nosebleeds, GI upset, or thinning of hair may occur. If any of these effects persist or worsen, tell your doctor or pharmacist promptly.   To relieve dry mouth, suck on (sugarless) hard candy or ice chips, chew (sugarless) gum, drink  water.   Remember that your doctor has prescribed this medication because he or she has judged that the benefit to you is greater than the risk of side effects. Many people using this medication do not have serious side effects.   Contact office immediately if you have any of these unlikely but serious side effects: mental/mood changes (e.g., depression,  aggressive or violent behavior, and in rare cases, thoughts of suicide), tingling feeling in the skin, quick/severe sun sensitivity, back/joint/muscle pain, signs of infection (e.g., fever, persistent sore throat, painful swallowing, peeling skin on palms/soles.   Isotretinoin may infrequently cause disease of the pancreatitis, that may rarely be fatal. Stop taking this medication and contact office immediately if you develop: severe stomach pain severe or persistent GI upset,   Stop taking this medication and tell your doctor immediately if you develop these unlikely but very serious side effects: severe headache, vision changes, ear ringing, hearling loss, chest pain, yellowing eyes, skin, dark urine, severe diarrhea, rectal bleeding,   Seek immediate medical attention if you notice any symptoms of a serious allergic reaction.    Accutane is discussed fully with the patient. It is a very effective drug to treat acne vulgaris but has many potential significant side effects. Chief among these are teratogensis, hepatic injury, dyslipidemia and severe drying of the mucous membranes. All of these issues have been discussed in details. Monthly blood tests to monitor lipids and liver functions will be necessary. Expect painful dryness and/or fissuring around the lips, eyes, and other moist areas of the body. Balms may be protective. Contact lens may be too painful to wear temporarily while on this drug. Episodes of significant depression have been reported, including suicidal ideation and attempts in rare cases. It may also cause pseudotumor cerebri and hyperostosis.  The patient will report any such changes in mood, depressive symptoms or suicidal thoughts, headaches, joint or bone pains. There is also a possible association with inflammatory bowel disease, although this is unproven at this point.

## 2017-12-08 ENCOUNTER — OFFICE VISIT (OUTPATIENT)
Dept: DERMATOLOGY | Facility: CLINIC | Age: 27
End: 2017-12-08
Payer: COMMERCIAL

## 2017-12-08 VITALS — DIASTOLIC BLOOD PRESSURE: 85 MMHG | SYSTOLIC BLOOD PRESSURE: 148 MMHG | OXYGEN SATURATION: 97 % | HEART RATE: 109 BPM

## 2017-12-08 DIAGNOSIS — L70.0 ACNE VULGARIS: Primary | ICD-10-CM

## 2017-12-08 LAB
ALBUMIN SERPL-MCNC: 4.5 G/DL (ref 3.4–5)
ALP SERPL-CCNC: 93 U/L (ref 40–150)
ALT SERPL W P-5'-P-CCNC: 29 U/L (ref 0–50)
ANION GAP SERPL CALCULATED.3IONS-SCNC: 9 MMOL/L (ref 3–14)
AST SERPL W P-5'-P-CCNC: 21 U/L (ref 0–45)
BETA HCG QUAL IFA URINE: NEGATIVE
BILIRUB SERPL-MCNC: 0.4 MG/DL (ref 0.2–1.3)
BUN SERPL-MCNC: 15 MG/DL (ref 7–30)
CALCIUM SERPL-MCNC: 9.3 MG/DL (ref 8.5–10.1)
CHLORIDE SERPL-SCNC: 104 MMOL/L (ref 94–109)
CHOLEST SERPL-MCNC: 153 MG/DL
CO2 SERPL-SCNC: 26 MMOL/L (ref 20–32)
CREAT SERPL-MCNC: 0.73 MG/DL (ref 0.52–1.04)
ERYTHROCYTE [DISTWIDTH] IN BLOOD BY AUTOMATED COUNT: 11 % (ref 10–15)
GFR SERPL CREATININE-BSD FRML MDRD: >90 ML/MIN/1.7M2
GLUCOSE SERPL-MCNC: 84 MG/DL (ref 70–99)
HCT VFR BLD AUTO: 45.5 % (ref 35–47)
HDLC SERPL-MCNC: 56 MG/DL
HGB BLD-MCNC: 15.7 G/DL (ref 11.7–15.7)
LDLC SERPL CALC-MCNC: 86 MG/DL
MCH RBC QN AUTO: 32.2 PG (ref 26.5–33)
MCHC RBC AUTO-ENTMCNC: 34.5 G/DL (ref 31.5–36.5)
MCV RBC AUTO: 93 FL (ref 78–100)
NONHDLC SERPL-MCNC: 97 MG/DL
PLATELET # BLD AUTO: 229 10E9/L (ref 150–450)
POTASSIUM SERPL-SCNC: 3.7 MMOL/L (ref 3.4–5.3)
PROT SERPL-MCNC: 8.4 G/DL (ref 6.8–8.8)
RBC # BLD AUTO: 4.87 10E12/L (ref 3.8–5.2)
SODIUM SERPL-SCNC: 139 MMOL/L (ref 133–144)
TRIGL SERPL-MCNC: 56 MG/DL
WBC # BLD AUTO: 9.4 10E9/L (ref 4–11)

## 2017-12-08 PROCEDURE — 84703 CHORIONIC GONADOTROPIN ASSAY: CPT | Performed by: PHYSICIAN ASSISTANT

## 2017-12-08 PROCEDURE — 80053 COMPREHEN METABOLIC PANEL: CPT | Performed by: PHYSICIAN ASSISTANT

## 2017-12-08 PROCEDURE — 80061 LIPID PANEL: CPT | Performed by: PHYSICIAN ASSISTANT

## 2017-12-08 PROCEDURE — 36415 COLL VENOUS BLD VENIPUNCTURE: CPT | Performed by: PHYSICIAN ASSISTANT

## 2017-12-08 PROCEDURE — 99213 OFFICE O/P EST LOW 20 MIN: CPT | Performed by: PHYSICIAN ASSISTANT

## 2017-12-08 PROCEDURE — 85027 COMPLETE CBC AUTOMATED: CPT | Performed by: PHYSICIAN ASSISTANT

## 2017-12-08 RX ORDER — ISOTRETINOIN 40 MG/1
40 CAPSULE ORAL
Qty: 30 CAPSULE | Refills: 0 | Status: SHIPPED | OUTPATIENT
Start: 2017-12-08 | End: 2018-01-15 | Stop reason: DRUGHIGH

## 2017-12-08 NOTE — PROGRESS NOTES
Vivian De La Torre is a 27 year old year old female patient here today for acne vulgaris. Here today to start isotretinoin. She denies history of depression. She would like to start isotretinoin. Patient reports that her skin did not see much improvements on doxycycline twice daily or ampicillin. She is also on clindamycin lotion, bpo wash, and differin. She notes that she will continue have flaring on regimen. She mostly recently tried spironolactone with little improvement. She reports she will get cystic areas around jaw line.  Associated symptoms: none.  Patient has no other skin complaints today.  Remainder of the HPI, Meds, PMH, Allergies, FH, and SH was reviewed in chart.    Pertinent Hx:   Acne Vulgaris   Past Medical History:   Diagnosis Date     ASCUS on Pap smear 8/15/11    HR HPV 66 and HPV 54     Chickenpox      History of colposcopy with cervical biopsy 9/9/11    benign       Past Surgical History:   Procedure Laterality Date     NO HISTORY OF SURGERY          Family History   Problem Relation Age of Onset     Hypertension Father      HEART DISEASE Father      Respiratory Paternal Grandfather      Alcohol/Drug Maternal Grandfather        Social History     Social History     Marital status: Single     Spouse name: N/A     Number of children: N/A     Years of education: N/A     Occupational History     Not on file.     Social History Main Topics     Smoking status: Current Every Day Smoker     Packs/day: 0.10     Last attempt to quit: 2/1/2013     Smokeless tobacco: Never Used      Comment: trying to quit again- 5-20-15     Alcohol use No     Drug use: No     Sexual activity: Yes     Partners: Male     Birth control/ protection: OCP, Pill     Other Topics Concern     Not on file     Social History Narrative       Outpatient Encounter Prescriptions as of 12/8/2017   Medication Sig Dispense Refill     ISOtretinoin (ACCUTANE) 40 MG capsule Take 1 capsule (40 mg) by mouth daily with food Ipledge: [id number]  30 capsule 0     acyclovir (ZOVIRAX) 400 MG tablet Take 1 tablet (400 mg) by mouth 3 times daily Uses only for outbreaks 21 tablet 0     spironolactone (ALDACTONE) 100 MG tablet Take 1 tablet (100 mg) by mouth daily 90 tablet 1     tretinoin (RETIN-A) 0.05 % cream Spread a pea size amount into affected area topically at bedtime.  Use sunscreen SPF>20. 45 g 11     Etonogestrel (NEXPLANON SC)        clindamycin (CLINDAMAX) 1 % lotion Apply twice daily to face. 60 mL 11     cyclobenzaprine (FLEXERIL) 5 MG tablet Take 1 tablet (5 mg) by mouth 3 times daily as needed for muscle spasms 42 tablet 0     No facility-administered encounter medications on file as of 12/8/2017.              Review Of Systems  Skin: As above  Eyes: negative  Ears/Nose/Throat: negative  Respiratory: No shortness of breath, dyspnea on exertion, cough, or hemoptysis  Cardiovascular: negative  Gastrointestinal: negative  Genitourinary: negative  Musculoskeletal: negative  Neurologic: negative  Psychiatric: negative  Hematologic/Lymphatic/Immunologic: negative  Endocrine: negative      O:   NAD, WDWN, Alert & Oriented, Mood & Affect wnl, Vitals stable   Here today alone   /85  Pulse 109  SpO2 97%   General appearance normal   Vitals stable   Alert, oriented and in no acute distress     2+ inflammatory papules and comedones on face       Eyes: Conjunctivae/lids:Normal     ENT: Lips    MSK:Normal    Cardiovascular: peripheral edema none    Pulm: Breathing Normal    Neuro/Psych: Orientation:Normal; Mood/Affect:Normal  A/P:  1. Acne Vulgaris     Start Isotretinoin 40 mg daily with food.   Standing CBC, urine pregnancy, CMP and fasting lipids  Ipledge reviewed with patient and Ipledge consent form complete  Patient place in ipledge system  Contraception: 1. Nexplanon 2. Partner's vasectomy   Ipledge: [id number]  Return to clinic 30 days  Dry lips and mouth, minor swelling of the eyelids or lips, crusty skin, nosebleeds, GI upset, or thinning of  hair may occur. If any of these effects persist or worsen, tell your doctor or pharmacist promptly.   To relieve dry mouth, suck on (sugarless) hard candy or ice chips, chew (sugarless) gum, drink water.   Remember that your doctor has prescribed this medication because he or she has judged that the benefit to you is greater than the risk of side effects. Many people using this medication do not have serious side effects.   Contact office immediately if you have any of these unlikely but serious side effects: mental/mood changes (e.g., depression,  aggressive or violent behavior, and in rare cases, thoughts of suicide), tingling feeling in the skin, quick/severe sun sensitivity, back/joint/muscle pain, signs of infection (e.g., fever, persistent sore throat, painful swallowing, peeling skin on palms/soles.   Isotretinoin may infrequently cause disease of the pancreatitis, that may rarely be fatal. Stop taking this medication and contact office immediately if you develop: severe stomach pain severe or persistent GI upset,   Stop taking this medication and tell your doctor immediately if you develop these unlikely but very serious side effects: severe headache, vision changes, ear ringing, hearling loss, chest pain, yellowing eyes, skin, dark urine, severe diarrhea, rectal bleeding,   Seek immediate medical attention if you notice any symptoms of a serious allergic reaction.     Accutane is discussed fully with the patient. It is a very effective drug to treat acne vulgaris but has many potential significant side effects. Chief among these are teratogensis, hepatic injury, dyslipidemia and severe drying of the mucous membranes. All of these issues have been discussed in details. Monthly blood tests to monitor lipids and liver functions will be necessary. Expect painful dryness and/or fissuring around the lips, eyes, and other moist areas of the body. Balms may be protective. Contact lens may be too painful to wear  temporarily while on this drug. Episodes of significant depression have been reported, including suicidal ideation and attempts in rare cases. It may also cause pseudotumor cerebri and hyperostosis. The patient will report any such changes in mood, depressive symptoms or suicidal thoughts, headaches, joint or bone pains. There is also a possible association with inflammatory bowel disease, although this is unproven at this point.

## 2017-12-08 NOTE — NURSING NOTE
"Initial /85  Pulse 109  SpO2 97% Estimated body mass index is 23.83 kg/(m^2) as calculated from the following:    Height as of 3/15/16: 1.638 m (5' 4.5\").    Weight as of 3/22/16: 64 kg (141 lb). .      "

## 2017-12-08 NOTE — MR AVS SNAPSHOT
After Visit Summary   12/8/2017    Vivian De La Torre    MRN: [id number]           Patient Information     Date Of Birth          1990        Visit Information        Provider Department      12/8/2017 11:40 AM Jennifer Hinton PA-C [facility]        Today's Diagnoses     Acne vulgaris    -  1       Follow-ups after your visit        Your next 10 appointments already scheduled     Jan 09, 2018  3:40 PM CST   [facility] Visit with Jennifer Hinton PA-C   [facility]    [address] [address]   [phone]            Feb 06, 2018  3:40 PM CST   Return Visit with Jennifer Hinton PA-C   [facility]    [address] [address]   [phone]            Mar 06, 2018  3:40 PM CST   Return Visit with Jennifer Hinton PA-C   [facility] ([facility]    [address] [address]   [phone]              Who to contact     If you have questions or need follow up information about today's clinic visit or your schedule please contact [facility] directly at [phone].  Normal or non-critical lab and imaging results will be communicated to you by ShopKeep POShart, letter or phone within 4 business days after the clinic has received the results. If you do not hear from us within 7 days, please contact the clinic through MyChart or phone. If you have a critical or abnormal lab result, we will notify you by phone as soon as possible.  Submit refill requests through Kensho or call your pharmacy and they will forward the refill request to us. Please allow 3 business days for your refill to be completed.          Additional Information About Your Visit        Kensho Information     Kensho lets you send messages to your doctor, view your test results, renew your prescriptions, schedule appointments  "and more. To sign up, go to [email or web address] . Click on \"Log in\" on the left side of the screen, which will take you to the Welcome page. Then click on \"Sign up Now\" on the right side of the page.     You will be asked to enter the access code listed below, as well as some personal information. Please follow the directions to create your username and password.     Your access code is: [email or web address]  Expires: 2018  7:46 AM     Your access code will  in 90 days. If you need help or a new code, please call your [place] clinic or [phone].        Care EveryWhere ID     This is your Care EveryWhere ID. This could be used by other organizations to access your [place] medical records  [id number]        Your Vitals Were     Pulse Pulse Oximetry                109 97%           Blood Pressure from Last 3 Encounters:   17 148/85   17 146/86   17 129/82    Weight from Last 3 Encounters:   16 64 kg (141 lb)   03/15/16 64 kg (141 lb)   05/20/15 72 kg (158 lb 12.8 oz)              We Performed the Following     Beta HCG qual IFA urine     CBC with platelets     Comprehensive metabolic panel     Lipid panel reflex to direct LDL Non-fasting          Today's Medication Changes          These changes are accurate as of: 17 12:30 PM.  If you have any questions, ask your nurse or doctor.               Start taking these medicines.        Dose/Directions    ISOtretinoin 40 MG capsule   Commonly known as:  ACCUTANE   Used for:  Acne vulgaris        Dose:  40 mg   Take 1 capsule (40 mg) by mouth daily with food Ipledge: [id number]   Quantity:  30 capsule   Refills:  0            Where to get your medicines      These medications were sent to [facility]-[address] [address]     Phone:  [phone]     ISOtretinoin 40 MG capsule                Primary Care Provider Fax #    Physician No Ref-Primary " [phone]       No address on file        Equal Access to Services     MARTIN AYDEE : Hadii aad wilfrido josé luis Johnston, roger alchristopherha, lincoln ferrari aidalisandroelliott, waxviolette rupain hayaakuldeep parrabritni butterfielddaily bonilla. So [facility] [place] [phone].    ATENCIÓN: Si habla español, tiene a sykes disposición servicios gratuitos de asistencia lingüística. Llame al [phone].    We comply with applicable federal civil rights laws and [place] laws. We do not discriminate on the basis of race, color, national origin, age, disability, sex, sexual orientation, or gender identity.            Thank you!     Thank you for choosing [facility]  for your care. Our goal is always to provide you with excellent care. Hearing back from our patients is one way we can continue to improve our services. Please take a few minutes to complete the written survey that you may receive in the mail after your visit with us. Thank you!             Your Updated Medication List - Protect others around you: Learn how to safely use, store and throw away your medicines at www.disposemymeds.org.          This list is accurate as of: 12/8/17 12:30 PM.  Always use your most recent med list.                   Brand Name Dispense Instructions for use Diagnosis    acyclovir 400 MG tablet    ZOVIRAX    21 tablet    Take 1 tablet (400 mg) by mouth 3 times daily Uses only for outbreaks    HSV-2 infection       clindamycin 1 % lotion    CLINDAMAX    60 mL    Apply twice daily to face.    Acne vulgaris       cyclobenzaprine 5 MG tablet    FLEXERIL    42 tablet    Take 1 tablet (5 mg) by mouth 3 times daily as needed for muscle spasms    SI (sacroiliac) joint dysfunction       ISOtretinoin 40 MG capsule    ACCUTANE    30 capsule    Take 1 capsule (40 mg) by mouth daily with food Ipledge: [id number]    Acne vulgaris       NEXPLANON SC           spironolactone 100 MG tablet    ALDACTONE    90 tablet    Take 1 tablet (100 mg) by mouth daily    Acne vulgaris        tretinoin 0.05 % cream    RETIN-A    45 g    Spread a pea size amount into affected area topically at bedtime.  Use sunscreen SPF>20.    Acne vulgaris

## 2017-12-08 NOTE — LETTER
12/8/2017         RE: Vivian De La Torre  [address] [address]        Dear Colleague,    Thank you for referring your patient, Vivian De La Torre, to the [facility]. Please see a copy of my visit note below.    Vivian De La Torre is a 27 year old year old female patient here today for acne vulgaris. Here today to start isotretinoin. She denies history of depression. She would like to start isotretinoin. Patient reports that her skin did not see much improvements on doxycycline twice daily or ampicillin. She is also on clindamycin lotion, bpo wash, and differin. She notes that she will continue have flaring on regimen. She mostly recently tried spironolactone with little improvement. She reports she will get cystic areas around jaw line.   Associated symptoms: none.  Patient has no other skin complaints today.  Remainder of the HPI, Meds, PMH, Allergies, FH, and SH was reviewed in chart.    Pertinent Hx:   Acne Vulgaris   Past Medical History:   Diagnosis Date     ASCUS on Pap smear 8/15/11    HR HPV 66 and HPV 54     Chickenpox      History of colposcopy with cervical biopsy 9/9/11    benign       Past Surgical History:   Procedure Laterality Date     NO HISTORY OF SURGERY          Family History   Problem Relation Age of Onset     Hypertension Father      HEART DISEASE Father      Respiratory Paternal Grandfather      Alcohol/Drug Maternal Grandfather        Social History     Social History     Marital status: Single     Spouse name: N/A     Number of children: N/A     Years of education: N/A     Occupational History     Not on file.     Social History Main Topics     Smoking status: Current Every Day Smoker     Packs/day: 0.10     Last attempt to quit: 2/1/2013     Smokeless tobacco: Never Used      Comment: trying to quit again- 5-20-15     Alcohol use No     Drug use: No     Sexual activity: Yes     Partners: Male     Birth control/ protection: OCP, Pill     Other Topics  Concern     Not on file     Social History Narrative       Outpatient Encounter Prescriptions as of 12/8/2017   Medication Sig Dispense Refill     ISOtretinoin (ACCUTANE) 40 MG capsule Take 1 capsule (40 mg) by mouth daily with food Ipledge: [id number] 30 capsule 0     acyclovir (ZOVIRAX) 400 MG tablet Take 1 tablet (400 mg) by mouth 3 times daily Uses only for outbreaks 21 tablet 0     spironolactone (ALDACTONE) 100 MG tablet Take 1 tablet (100 mg) by mouth daily 90 tablet 1     tretinoin (RETIN-A) 0.05 % cream Spread a pea size amount into affected area topically at bedtime.  Use sunscreen SPF>20. 45 g 11     Etonogestrel (NEXPLANON SC)        clindamycin (CLINDAMAX) 1 % lotion Apply twice daily to face. 60 mL 11     cyclobenzaprine (FLEXERIL) 5 MG tablet Take 1 tablet (5 mg) by mouth 3 times daily as needed for muscle spasms 42 tablet 0     No facility-administered encounter medications on file as of 12/8/2017.              Review Of Systems  Skin: As above  Eyes: negative  Ears/Nose/Throat: negative  Respiratory: No shortness of breath, dyspnea on exertion, cough, or hemoptysis  Cardiovascular: negative  Gastrointestinal: negative  Genitourinary: negative  Musculoskeletal: negative  Neurologic: negative  Psychiatric: negative  Hematologic/Lymphatic/Immunologic: negative  Endocrine: negative      O:   NAD, WDWN, Alert & Oriented, Mood & Affect wnl, Vitals stable   Here today alone   /85  Pulse 109  SpO2 97%   General appearance normal   Vitals stable   Alert, oriented and in no acute distress     2+ inflammatory papules and comedones on face       Eyes: Conjunctivae/lids:Normal     ENT: Lips    MSK:Normal    Cardiovascular: peripheral edema none    Pulm: Breathing Normal    Neuro/Psych: Orientation:Normal; Mood/Affect:Normal  A/P:  1. Acne Vulgaris     Start Isotretinoin 40 mg daily with food.   Standing CBC, urine pregnancy, CMP and fasting lipids  Ipledge reviewed with patient and Ipledge consent form  complete  Patient place in ipledge system  Contraception: 1. Nexplanon 2. Partner's vasectomy   Ipledge: [id number]  Return to clinic 30 days  Dry lips and mouth, minor swelling of the eyelids or lips, crusty skin, nosebleeds, GI upset, or thinning of hair may occur. If any of these effects persist or worsen, tell your doctor or pharmacist promptly.   To relieve dry mouth, suck on (sugarless) hard candy or ice chips, chew (sugarless) gum, drink water.   Remember that your doctor has prescribed this medication because he or she has judged that the benefit to you is greater than the risk of side effects. Many people using this medication do not have serious side effects.   Contact office immediately if you have any of these unlikely but serious side effects: mental/mood changes (e.g., depression,  aggressive or violent behavior, and in rare cases, thoughts of suicide), tingling feeling in the skin, quick/severe sun sensitivity, back/joint/muscle pain, signs of infection (e.g., fever, persistent sore throat, painful swallowing, peeling skin on palms/soles.   Isotretinoin may infrequently cause disease of the pancreatitis, that may rarely be fatal. Stop taking this medication and contact office immediately if you develop: severe stomach pain severe or persistent GI upset,   Stop taking this medication and tell your doctor immediately if you develop these unlikely but very serious side effects: severe headache, vision changes, ear ringing, hearling loss, chest pain, yellowing eyes, skin, dark urine, severe diarrhea, rectal bleeding,   Seek immediate medical attention if you notice any symptoms of a serious allergic reaction.     Accutane is discussed fully with the patient. It is a very effective drug to treat acne vulgaris but has many potential significant side effects. Chief among these are teratogensis, hepatic injury, dyslipidemia and severe drying of the mucous membranes. All of these issues have been discussed in  details. Monthly blood tests to monitor lipids and liver functions will be necessary. Expect painful dryness and/or fissuring around the lips, eyes, and other moist areas of the body. Balms may be protective. Contact lens may be too painful to wear temporarily while on this drug. Episodes of significant depression have been reported, including suicidal ideation and attempts in rare cases. It may also cause pseudotumor cerebri and hyperostosis. The patient will report any such changes in mood, depressive symptoms or suicidal thoughts, headaches, joint or bone pains. There is also a possible association with inflammatory bowel disease, although this is unproven at this point.        Again, thank you for allowing me to participate in the care of your patient.        Sincerely,        Jennifer Casiano PA-C

## 2018-01-09 ENCOUNTER — OFFICE VISIT (OUTPATIENT)
Dept: DERMATOLOGY | Facility: CLINIC | Age: 28
End: 2018-01-09
Payer: COMMERCIAL

## 2018-01-09 VITALS — HEART RATE: 98 BPM | SYSTOLIC BLOOD PRESSURE: 131 MMHG | DIASTOLIC BLOOD PRESSURE: 89 MMHG | OXYGEN SATURATION: 99 %

## 2018-01-09 DIAGNOSIS — L70.0 ACNE VULGARIS: ICD-10-CM

## 2018-01-09 DIAGNOSIS — L70.0 ACNE VULGARIS: Primary | ICD-10-CM

## 2018-01-09 LAB
ALBUMIN SERPL-MCNC: 4.3 G/DL (ref 3.4–5)
ALP SERPL-CCNC: 91 U/L (ref 40–150)
ALT SERPL W P-5'-P-CCNC: 25 U/L (ref 0–50)
ANION GAP SERPL CALCULATED.3IONS-SCNC: 6 MMOL/L (ref 3–14)
AST SERPL W P-5'-P-CCNC: 20 U/L (ref 0–45)
BETA HCG QUAL IFA URINE: NEGATIVE
BILIRUB SERPL-MCNC: 0.2 MG/DL (ref 0.2–1.3)
BUN SERPL-MCNC: 10 MG/DL (ref 7–30)
CALCIUM SERPL-MCNC: 9.1 MG/DL (ref 8.5–10.1)
CHLORIDE SERPL-SCNC: 109 MMOL/L (ref 94–109)
CHOLEST SERPL-MCNC: 147 MG/DL
CO2 SERPL-SCNC: 25 MMOL/L (ref 20–32)
CREAT SERPL-MCNC: 0.65 MG/DL (ref 0.52–1.04)
ERYTHROCYTE [DISTWIDTH] IN BLOOD BY AUTOMATED COUNT: 10.9 % (ref 10–15)
GFR SERPL CREATININE-BSD FRML MDRD: >90 ML/MIN/1.7M2
GLUCOSE SERPL-MCNC: 74 MG/DL (ref 70–99)
HCT VFR BLD AUTO: 41.2 % (ref 35–47)
HDLC SERPL-MCNC: 50 MG/DL
HGB BLD-MCNC: 14.2 G/DL (ref 11.7–15.7)
LDLC SERPL CALC-MCNC: 72 MG/DL
MCH RBC QN AUTO: 32.4 PG (ref 26.5–33)
MCHC RBC AUTO-ENTMCNC: 34.5 G/DL (ref 31.5–36.5)
MCV RBC AUTO: 94 FL (ref 78–100)
NONHDLC SERPL-MCNC: 97 MG/DL
PLATELET # BLD AUTO: 231 10E9/L (ref 150–450)
POTASSIUM SERPL-SCNC: 3.8 MMOL/L (ref 3.4–5.3)
PROT SERPL-MCNC: 7.9 G/DL (ref 6.8–8.8)
RBC # BLD AUTO: 4.38 10E12/L (ref 3.8–5.2)
SODIUM SERPL-SCNC: 140 MMOL/L (ref 133–144)
TRIGL SERPL-MCNC: 126 MG/DL
WBC # BLD AUTO: 9.5 10E9/L (ref 4–11)

## 2018-01-09 PROCEDURE — 36415 COLL VENOUS BLD VENIPUNCTURE: CPT | Performed by: PHYSICIAN ASSISTANT

## 2018-01-09 PROCEDURE — 80053 COMPREHEN METABOLIC PANEL: CPT | Performed by: PHYSICIAN ASSISTANT

## 2018-01-09 PROCEDURE — 84703 CHORIONIC GONADOTROPIN ASSAY: CPT | Performed by: PHYSICIAN ASSISTANT

## 2018-01-09 PROCEDURE — 80061 LIPID PANEL: CPT | Performed by: PHYSICIAN ASSISTANT

## 2018-01-09 PROCEDURE — 85027 COMPLETE CBC AUTOMATED: CPT | Performed by: PHYSICIAN ASSISTANT

## 2018-01-09 PROCEDURE — 99213 OFFICE O/P EST LOW 20 MIN: CPT | Performed by: PHYSICIAN ASSISTANT

## 2018-01-09 RX ORDER — ISOTRETINOIN 30 MG/1
1 CAPSULE ORAL 2 TIMES DAILY WITH MEALS
Qty: 60 CAPSULE | Refills: 0 | Status: SHIPPED | OUTPATIENT
Start: 2018-01-09 | End: 2018-02-06

## 2018-01-09 NOTE — LETTER
1/9/2018         RE: Vivian De La Torre  [address] [facility] [address]        Dear Colleague,    Thank you for referring your patient, Vivian De La Torre, to the [facility]. Please see a copy of my visit note below.    Vivian De La Torre is a 27 year old year old female patient here today for recheck acne vulgaris. Patient reports that she finished first month of isotretinoin. She is taking 40 mg daily. She denies any side effects except for dry skin. She denies any mood changes.  Patient has no other skin complaints today.  Remainder of the HPI, Meds, PMH, Allergies, FH, and SH was reviewed in chart.    Pertinent Hx:  Acne Vulgaris   Past Medical History:   Diagnosis Date     ASCUS on Pap smear 8/15/11    HR HPV 66 and HPV 54     Chickenpox      History of colposcopy with cervical biopsy 9/9/11    benign       Past Surgical History:   Procedure Laterality Date     NO HISTORY OF SURGERY          Family History   Problem Relation Age of Onset     Hypertension Father      HEART DISEASE Father      Respiratory Paternal Grandfather      Alcohol/Drug Maternal Grandfather        Social History     Social History     Marital status: Single     Spouse name: N/A     Number of children: N/A     Years of education: N/A     Occupational History     Not on file.     Social History Main Topics     Smoking status: Current Every Day Smoker     Packs/day: 0.10     Last attempt to quit: 2/1/2013     Smokeless tobacco: Never Used      Comment: trying to quit again- 5-20-15     Alcohol use No     Drug use: No     Sexual activity: Yes     Partners: Male     Birth control/ protection: OCP, Pill     Other Topics Concern     Not on file     Social History Narrative       Outpatient Encounter Prescriptions as of 1/9/2018   Medication Sig Dispense Refill     ISOtretinoin 30 MG CAPS Take 1 capsule by mouth 2 times daily (with meals) 60 capsule 0     ISOtretinoin (ACCUTANE) 40 MG capsule Take 1 capsule (40 mg) by  mouth daily with food Ipledge: [id number] 30 capsule 0     Etonogestrel (NEXPLANON SC)        cyclobenzaprine (FLEXERIL) 5 MG tablet Take 1 tablet (5 mg) by mouth 3 times daily as needed for muscle spasms 42 tablet 0     acyclovir (ZOVIRAX) 400 MG tablet Take 1 tablet (400 mg) by mouth 3 times daily Uses only for outbreaks (Patient not taking: Reported on 1/9/2018) 21 tablet 0     spironolactone (ALDACTONE) 100 MG tablet Take 1 tablet (100 mg) by mouth daily (Patient not taking: Reported on 1/9/2018) 90 tablet 1     tretinoin (RETIN-A) 0.05 % cream Spread a pea size amount into affected area topically at bedtime.  Use sunscreen SPF>20. (Patient not taking: Reported on 1/9/2018) 45 g 11     clindamycin (CLINDAMAX) 1 % lotion Apply twice daily to face. (Patient not taking: Reported on 1/9/2018) 60 mL 11     No facility-administered encounter medications on file as of 1/9/2018.              Review Of Systems  Skin: As above  Eyes: negative  Ears/Nose/Throat: negative  Respiratory: No shortness of breath, dyspnea on exertion, cough, or hemoptysis  Cardiovascular: negative  Gastrointestinal: negative  Genitourinary: negative  Musculoskeletal: negative  Neurologic: negative  Psychiatric: negative  Hematologic/Lymphatic/Immunologic: negative  Endocrine: negative      O:   NAD, WDWN, Alert & Oriented, Mood & Affect wnl, Vitals stable   Here today alone   /89  Pulse 98  SpO2 99%   General appearance normal   Vitals stable   Alert, oriented and in no acute distress     2+ inflammatory papules on face and comedones       Eyes: Conjunctivae/lids:Normal     ENT: Lips    MSK:Normal    Pulm: Breathing Normal    Neuro/Psych: Orientation:Normal; Mood/Affect:Normal  A/P:  1. Acne Vulgaris   Increase  Isotretinoin 40 mg twice daily with food.   Standing CBC, urine pregnancy, CMP and fasting lipids  Ipledge reviewed with patient and Ipledge consent form complete  Patient place in ipledge system  Contraception: 1. Nexplanon 2.  Partner's vasectomy   Ipledge: [id number]  Return to clinic 30 days  Dry lips and mouth, minor swelling of the eyelids or lips, crusty skin, nosebleeds, GI upset, or thinning of hair may occur. If any of these effects persist or worsen, tell your doctor or pharmacist promptly.   To relieve dry mouth, suck on (sugarless) hard candy or ice chips, chew (sugarless) gum, drink water.   Remember that your doctor has prescribed this medication because he or she has judged that the benefit to you is greater than the risk of side effects. Many people using this medication do not have serious side effects.   Contact office immediately if you have any of these unlikely but serious side effects: mental/mood changes (e.g., depression,  aggressive or violent behavior, and in rare cases, thoughts of suicide), tingling feeling in the skin, quick/severe sun sensitivity, back/joint/muscle pain, signs of infection (e.g., fever, persistent sore throat, painful swallowing, peeling skin on palms/soles.   Isotretinoin may infrequently cause disease of the pancreatitis, that may rarely be fatal. Stop taking this medication and contact office immediately if you develop: severe stomach pain severe or persistent GI upset,   Stop taking this medication and tell your doctor immediately if you develop these unlikely but very serious side effects: severe headache, vision changes, ear ringing, hearling loss, chest pain, yellowing eyes, skin, dark urine, severe diarrhea, rectal bleeding,   Seek immediate medical attention if you notice any symptoms of a serious allergic reaction.      Accutane is discussed fully with the patient. It is a very effective drug to treat acne vulgaris but has many potential significant side effects. Chief among these are teratogensis, hepatic injury, dyslipidemia and severe drying of the mucous membranes. All of these issues have been discussed in details. Monthly blood tests to monitor lipids and liver functions will  be necessary. Expect painful dryness and/or fissuring around the lips, eyes, and other moist areas of the body. Balms may be protective. Contact lens may be too painful to wear temporarily while on this drug. Episodes of significant depression have been reported, including suicidal ideation and attempts in rare cases. It may also cause pseudotumor cerebri and hyperostosis. The patient will report any such changes in mood, depressive symptoms or suicidal thoughts, headaches, joint or bone pains. There is also a possible association with inflammatory bowel disease, although this is unproven at this point.       Again, thank you for allowing me to participate in the care of your patient.        Sincerely,        Jennifer Casiano PA-C

## 2018-01-09 NOTE — NURSING NOTE
"Chief Complaint   Patient presents with     Derm Problem     acne recheck       Initial /89  Pulse 98  SpO2 99% Estimated body mass index is 23.83 kg/(m^2) as calculated from the following:    Height as of 3/15/16: 1.638 m (5' 4.5\").    Weight as of 3/22/16: 64 kg (141 lb).  BP completed using cuff size: margarita Brito LPN    "

## 2018-01-09 NOTE — MR AVS SNAPSHOT
"              After Visit Summary   1/9/2018    Vivian De La Torre    MRN: [id number]           Patient Information     Date Of Birth          1990        Visit Information        Provider Department      1/9/2018 3:40 PM Jennifer Hinton PA-C [facility]        Today's Diagnoses     Acne vulgaris    -  1       Follow-ups after your visit        Your next 10 appointments already scheduled     Feb 06, 2018  3:40 PM CST   [facility] Visit with Jennifer Hinton PA-C   [facility]    [address] [address]   [phone]            Mar 06, 2018  3:40 PM CST   Return Visit with Jennifer Hinton PA-C   [facility]    [address] [id number] [phone]              Who to contact     If you have questions or need follow up information about today's clinic visit or your schedule please contact [facility] directly at [phone].  Normal or non-critical lab and imaging results will be communicated to you by [employer], letter or phone within 4 business days after the clinic has received the results. If you do not hear from us within 7 days, please contact the clinic through [employer] or phone. If you have a critical or abnormal lab result, we will notify you by phone as soon as possible.  Submit refill requests through [employer] or call your pharmacy and they will forward the refill request to us. Please allow 3 business days for your refill to be completed.          Additional Information About Your Visit        [employer] Information     [employer]tras lets you send messages to your doctor, view your test results, renew your prescriptions, schedule appointments and more. To sign up, go to [email or web address] . Click on \"Log in\" on the left side of the screen, which will take you to the Welcome page. Then click on \"Sign up Now\" on the right side of the page. "     You will be asked to enter the access code listed below, as well as some personal information. Please follow the directions to create your username and password.     Your access code is: [email or web address]  Expires: 2018  7:46 AM     Your access code will  in 90 days. If you need help or a new code, please call your [facility] clinic or [phone].        Care EveryWhere ID     This is your Care EveryWhere ID. This could be used by other organizations to access your [facility] medical records  [id number]        Your Vitals Were     Pulse Pulse Oximetry                98 99%           Blood Pressure from Last 3 Encounters:   18 131/89   17 148/85   17 146/86    Weight from Last 3 Encounters:   16 64 kg (141 lb)   03/15/16 64 kg (141 lb)   05/20/15 72 kg (158 lb 12.8 oz)              Today, you had the following     No orders found for display         Today's Medication Changes          These changes are accurate as of: 18 11:59 PM.  If you have any questions, ask your nurse or doctor.               These medicines have changed or have updated prescriptions.        Dose/Directions    ISOtretinoin 30 MG Caps   This may have changed:    - medication strength  - when to take this  - additional instructions   Used for:  Acne vulgaris   Changed by:  Jennifer Hinton PA-C        Dose:  1 capsule   Take 1 capsule by mouth 2 times daily (with meals)   Quantity:  60 capsule   Refills:  0         Stop taking these medicines if you haven't already. Please contact your care team if you have questions.     spironolactone 100 MG tablet   Commonly known as:  ALDACTONE   Stopped by:  Jennifer Hinton PA-C           tretinoin 0.05 % cream   Commonly known as:  RETIN-A   Stopped by:  Jennifer Hinton PA-C                Where to get your medicines      These medications were sent to [facility] - [address]  [place] [address]     Phone:  [phone]     ISOtretinoin 30 MG Caps                Primary Care Provider Fax #    Physician No Ref-Primary [phone]       No address on file        Equal Access to Services     MARTIN BAJWA : Hadii aad ku hadbrien Johnston, roger atkinson, lincoln leyva, pallavi easton laAlbertolinda bonilla. So [facility] [phone].    ATENCIÓN: Si habla español, tiene a sykes disposición servicios gratuitos de asistencia lingüística. Llame al [phone].    We comply with applicable federal civil rights laws and [place] laws. We do not discriminate on the basis of race, color, national origin, age, disability, sex, sexual orientation, or gender identity.            Thank you!     Thank you for choosing [facility]  for your care. Our goal is always to provide you with excellent care. Hearing back from our patients is one way we can continue to improve our services. Please take a few minutes to complete the written survey that you may receive in the mail after your visit with us. Thank you!             Your Updated Medication List - Protect others around you: Learn how to safely use, store and throw away your medicines at www.disposemymeds.org.          This list is accurate as of: 1/9/18 11:59 PM.  Always use your most recent med list.                   Brand Name Dispense Instructions for use Diagnosis    acyclovir 400 MG tablet    ZOVIRAX    21 tablet    Take 1 tablet (400 mg) by mouth 3 times daily Uses only for outbreaks    HSV-2 infection       clindamycin 1 % lotion    CLINDAMAX    60 mL    Apply twice daily to face.    Acne vulgaris       cyclobenzaprine 5 MG tablet    FLEXERIL    42 tablet    Take 1 tablet (5 mg) by mouth 3 times daily as needed for muscle spasms    SI (sacroiliac) joint dysfunction       ISOtretinoin 30 MG Caps     60 capsule    Take 1 capsule by mouth 2 times daily (with meals)    Acne vulgaris       NEXPLANON SC

## 2018-01-15 NOTE — PROGRESS NOTES
Vivian De La Torre is a 27 year old year old female patient here today for recheck acne vulgaris. Patient reports that she finished first month of isotretinoin. She is taking 40 mg daily. She denies any side effects except for dry skin. She denies any mood changes.  Patient has no other skin complaints today.  Remainder of the HPI, Meds, PMH, Allergies, FH, and SH was reviewed in chart.    Pertinent Hx:  Acne Vulgaris   Past Medical History:   Diagnosis Date     ASCUS on Pap smear 8/15/11    HR HPV 66 and HPV 54     Chickenpox      History of colposcopy with cervical biopsy 9/9/11    benign       Past Surgical History:   Procedure Laterality Date     NO HISTORY OF SURGERY          Family History   Problem Relation Age of Onset     Hypertension Father      HEART DISEASE Father      Respiratory Paternal Grandfather      Alcohol/Drug Maternal Grandfather        Social History     Social History     Marital status: Single     Spouse name: N/A     Number of children: N/A     Years of education: N/A     Occupational History     Not on file.     Social History Main Topics     Smoking status: Current Every Day Smoker     Packs/day: 0.10     Last attempt to quit: 2/1/2013     Smokeless tobacco: Never Used      Comment: trying to quit again- 5-20-15     Alcohol use No     Drug use: No     Sexual activity: Yes     Partners: Male     Birth control/ protection: OCP, Pill     Other Topics Concern     Not on file     Social History Narrative       Outpatient Encounter Prescriptions as of 1/9/2018   Medication Sig Dispense Refill     ISOtretinoin 30 MG CAPS Take 1 capsule by mouth 2 times daily (with meals) 60 capsule 0     ISOtretinoin (ACCUTANE) 40 MG capsule Take 1 capsule (40 mg) by mouth daily with food Ipledge: [id number] 30 capsule 0     Etonogestrel (NEXPLANON SC)        cyclobenzaprine (FLEXERIL) 5 MG tablet Take 1 tablet (5 mg) by mouth 3 times daily as needed for muscle spasms 42 tablet 0     acyclovir (ZOVIRAX) 400 MG  tablet Take 1 tablet (400 mg) by mouth 3 times daily Uses only for outbreaks (Patient not taking: Reported on 1/9/2018) 21 tablet 0     spironolactone (ALDACTONE) 100 MG tablet Take 1 tablet (100 mg) by mouth daily (Patient not taking: Reported on 1/9/2018) 90 tablet 1     tretinoin (RETIN-A) 0.05 % cream Spread a pea size amount into affected area topically at bedtime.  Use sunscreen SPF>20. (Patient not taking: Reported on 1/9/2018) 45 g 11     clindamycin (CLINDAMAX) 1 % lotion Apply twice daily to face. (Patient not taking: Reported on 1/9/2018) 60 mL 11     No facility-administered encounter medications on file as of 1/9/2018.              Review Of Systems  Skin: As above  Eyes: negative  Ears/Nose/Throat: negative  Respiratory: No shortness of breath, dyspnea on exertion, cough, or hemoptysis  Cardiovascular: negative  Gastrointestinal: negative  Genitourinary: negative  Musculoskeletal: negative  Neurologic: negative  Psychiatric: negative  Hematologic/Lymphatic/Immunologic: negative  Endocrine: negative      O:   NAD, WDWN, Alert & Oriented, Mood & Affect wnl, Vitals stable   Here today alone   /89  Pulse 98  SpO2 99%   General appearance normal   Vitals stable   Alert, oriented and in no acute distress     2+ inflammatory papules on face and comedones       Eyes: Conjunctivae/lids:Normal     ENT: Lips    MSK:Normal    Pulm: Breathing Normal    Neuro/Psych: Orientation:Normal; Mood/Affect:Normal  A/P:  1. Acne Vulgaris   Increase  Isotretinoin 40 mg twice daily with food.   Standing CBC, urine pregnancy, CMP and fasting lipids  Ipledge reviewed with patient and Ipledge consent form complete  Patient place in ipledge system  Contraception: 1. Nexplanon 2. Partner's vasectomy   Ipledge: [id number]  Return to clinic 30 days  Dry lips and mouth, minor swelling of the eyelids or lips, crusty skin, nosebleeds, GI upset, or thinning of hair may occur. If any of these effects persist or worsen, tell your  doctor or pharmacist promptly.   To relieve dry mouth, suck on (sugarless) hard candy or ice chips, chew (sugarless) gum, drink water.   Remember that your doctor has prescribed this medication because he or she has judged that the benefit to you is greater than the risk of side effects. Many people using this medication do not have serious side effects.   Contact office immediately if you have any of these unlikely but serious side effects: mental/mood changes (e.g., depression,  aggressive or violent behavior, and in rare cases, thoughts of suicide), tingling feeling in the skin, quick/severe sun sensitivity, back/joint/muscle pain, signs of infection (e.g., fever, persistent sore throat, painful swallowing, peeling skin on palms/soles.   Isotretinoin may infrequently cause disease of the pancreatitis, that may rarely be fatal. Stop taking this medication and contact office immediately if you develop: severe stomach pain severe or persistent GI upset,   Stop taking this medication and tell your doctor immediately if you develop these unlikely but very serious side effects: severe headache, vision changes, ear ringing, hearling loss, chest pain, yellowing eyes, skin, dark urine, severe diarrhea, rectal bleeding,   Seek immediate medical attention if you notice any symptoms of a serious allergic reaction.      Accutane is discussed fully with the patient. It is a very effective drug to treat acne vulgaris but has many potential significant side effects. Chief among these are teratogensis, hepatic injury, dyslipidemia and severe drying of the mucous membranes. All of these issues have been discussed in details. Monthly blood tests to monitor lipids and liver functions will be necessary. Expect painful dryness and/or fissuring around the lips, eyes, and other moist areas of the body. Balms may be protective. Contact lens may be too painful to wear temporarily while on this drug. Episodes of significant depression have  been reported, including suicidal ideation and attempts in rare cases. It may also cause pseudotumor cerebri and hyperostosis. The patient will report any such changes in mood, depressive symptoms or suicidal thoughts, headaches, joint or bone pains. There is also a possible association with inflammatory bowel disease, although this is unproven at this point.

## 2018-01-30 DIAGNOSIS — B00.9 HSV-2 INFECTION: ICD-10-CM

## 2018-01-31 RX ORDER — ACYCLOVIR 400 MG/1
TABLET ORAL
Qty: 21 TABLET | Refills: 0 | OUTPATIENT
Start: 2018-01-31

## 2018-02-06 ENCOUNTER — OFFICE VISIT (OUTPATIENT)
Dept: DERMATOLOGY | Facility: CLINIC | Age: 28
End: 2018-02-06
Payer: COMMERCIAL

## 2018-02-06 VITALS — HEART RATE: 92 BPM | OXYGEN SATURATION: 98 % | SYSTOLIC BLOOD PRESSURE: 142 MMHG | DIASTOLIC BLOOD PRESSURE: 85 MMHG

## 2018-02-06 DIAGNOSIS — L70.0 ACNE VULGARIS: ICD-10-CM

## 2018-02-06 LAB
ALBUMIN SERPL-MCNC: 4.4 G/DL (ref 3.4–5)
ALP SERPL-CCNC: 91 U/L (ref 40–150)
ALT SERPL W P-5'-P-CCNC: 23 U/L (ref 0–50)
ANION GAP SERPL CALCULATED.3IONS-SCNC: 5 MMOL/L (ref 3–14)
AST SERPL W P-5'-P-CCNC: 25 U/L (ref 0–45)
BETA HCG QUAL IFA URINE: NEGATIVE
BILIRUB SERPL-MCNC: 0.3 MG/DL (ref 0.2–1.3)
BUN SERPL-MCNC: 11 MG/DL (ref 7–30)
CALCIUM SERPL-MCNC: 8.9 MG/DL (ref 8.5–10.1)
CHLORIDE SERPL-SCNC: 107 MMOL/L (ref 94–109)
CHOLEST SERPL-MCNC: 171 MG/DL
CO2 SERPL-SCNC: 26 MMOL/L (ref 20–32)
CREAT SERPL-MCNC: 0.62 MG/DL (ref 0.52–1.04)
ERYTHROCYTE [DISTWIDTH] IN BLOOD BY AUTOMATED COUNT: 11.2 % (ref 10–15)
GFR SERPL CREATININE-BSD FRML MDRD: >90 ML/MIN/1.7M2
GLUCOSE SERPL-MCNC: 78 MG/DL (ref 70–99)
HCT VFR BLD AUTO: 41.4 % (ref 35–47)
HDLC SERPL-MCNC: 45 MG/DL
HGB BLD-MCNC: 14 G/DL (ref 11.7–15.7)
LDLC SERPL CALC-MCNC: 113 MG/DL
MCH RBC QN AUTO: 32.1 PG (ref 26.5–33)
MCHC RBC AUTO-ENTMCNC: 33.8 G/DL (ref 31.5–36.5)
MCV RBC AUTO: 95 FL (ref 78–100)
NONHDLC SERPL-MCNC: 126 MG/DL
PLATELET # BLD AUTO: 225 10E9/L (ref 150–450)
POTASSIUM SERPL-SCNC: 3.6 MMOL/L (ref 3.4–5.3)
PROT SERPL-MCNC: 7.9 G/DL (ref 6.8–8.8)
RBC # BLD AUTO: 4.36 10E12/L (ref 3.8–5.2)
SODIUM SERPL-SCNC: 138 MMOL/L (ref 133–144)
TRIGL SERPL-MCNC: 66 MG/DL
WBC # BLD AUTO: 8.2 10E9/L (ref 4–11)

## 2018-02-06 PROCEDURE — 80061 LIPID PANEL: CPT | Performed by: PHYSICIAN ASSISTANT

## 2018-02-06 PROCEDURE — 85027 COMPLETE CBC AUTOMATED: CPT | Performed by: PHYSICIAN ASSISTANT

## 2018-02-06 PROCEDURE — 36415 COLL VENOUS BLD VENIPUNCTURE: CPT | Performed by: PHYSICIAN ASSISTANT

## 2018-02-06 PROCEDURE — 84703 CHORIONIC GONADOTROPIN ASSAY: CPT | Performed by: PHYSICIAN ASSISTANT

## 2018-02-06 PROCEDURE — 99213 OFFICE O/P EST LOW 20 MIN: CPT | Performed by: PHYSICIAN ASSISTANT

## 2018-02-06 PROCEDURE — 80053 COMPREHEN METABOLIC PANEL: CPT | Performed by: PHYSICIAN ASSISTANT

## 2018-02-06 RX ORDER — ISOTRETINOIN 30 MG/1
1 CAPSULE ORAL 2 TIMES DAILY WITH MEALS
Qty: 60 CAPSULE | Refills: 0 | Status: SHIPPED | OUTPATIENT
Start: 2018-02-06 | End: 2018-03-06 | Stop reason: DRUGHIGH

## 2018-02-06 NOTE — NURSING NOTE
"Chief Complaint   Patient presents with     Derm Problem     accutane recheck       Initial /85  Pulse 92  SpO2 98% Estimated body mass index is 23.83 kg/(m^2) as calculated from the following:    Height as of 3/15/16: 1.638 m (5' 4.5\").    Weight as of 3/22/16: 64 kg (141 lb).  BP completed using cuff size: booker Brito LPN    "

## 2018-02-06 NOTE — PROGRESS NOTES
Vivian De La Torre is a 27 year old year old female patient here today for recheck acne vulgaris.  Patient finished second month, she is currently on 30 mg twice daily with food. She denies any side effects except for dry skin. She denies any mood changes. She hasn't noticed much improvement with skin yet. Patient has no other skin complaints today.  Remainder of the HPI, Meds, PMH, Allergies, FH, and SH was reviewed in chart.    Pertinent Hx:   Acne vulgaris   Past Medical History:   Diagnosis Date     ASCUS on Pap smear 8/15/11    HR HPV 66 and HPV 54     Chickenpox      History of colposcopy with cervical biopsy 9/9/11    benign       Past Surgical History:   Procedure Laterality Date     NO HISTORY OF SURGERY          Family History   Problem Relation Age of Onset     Hypertension Father      HEART DISEASE Father      Respiratory Paternal Grandfather      Alcohol/Drug Maternal Grandfather        Social History     Social History     Marital status: Single     Spouse name: N/A     Number of children: N/A     Years of education: N/A     Occupational History     Not on file.     Social History Main Topics     Smoking status: Current Every Day Smoker     Packs/day: 0.10     Last attempt to quit: 2/1/2013     Smokeless tobacco: Never Used      Comment: trying to quit again- 5-20-15     Alcohol use No     Drug use: No     Sexual activity: Yes     Partners: Male     Birth control/ protection: OCP, Pill     Other Topics Concern     Not on file     Social History Narrative       Outpatient Encounter Prescriptions as of 2/6/2018   Medication Sig Dispense Refill     ISOtretinoin 30 MG CAPS Take 1 capsule by mouth 2 times daily (with meals) 60 capsule 0     Etonogestrel (NEXPLANON SC)        cyclobenzaprine (FLEXERIL) 5 MG tablet Take 1 tablet (5 mg) by mouth 3 times daily as needed for muscle spasms 42 tablet 0     [DISCONTINUED] ISOtretinoin 30 MG CAPS Take 1 capsule by mouth 2 times daily (with meals) 60 capsule 0      acyclovir (ZOVIRAX) 400 MG tablet Take 1 tablet (400 mg) by mouth 3 times daily Uses only for outbreaks (Patient not taking: Reported on 1/9/2018) 21 tablet 0     clindamycin (CLINDAMAX) 1 % lotion Apply twice daily to face. (Patient not taking: Reported on 1/9/2018) 60 mL 11     No facility-administered encounter medications on file as of 2/6/2018.              Review Of Systems  Skin: As above  Eyes: negative  Ears/Nose/Throat: negative  Respiratory: No shortness of breath, dyspnea on exertion, cough, or hemoptysis  Cardiovascular: negative  Gastrointestinal: negative  Genitourinary: negative  Musculoskeletal: negative  Neurologic: negative  Psychiatric: negative  Hematologic/Lymphatic/Immunologic: negative  Endocrine: negative      O:   NAD, WDWN, Alert & Oriented, Mood & Affect wnl, Vitals stable   Here today alone   /85  Pulse 92  SpO2 98%   General appearance normal   Vitals stable   Alert, oriented and in no acute distress     2+ inflammatory papules and comedones on face       Eyes: Conjunctivae/lids:Normal     ENT: Lips: normal    MSK:Normal    Pulm: Breathing Normal    Neuro/Psych: Orientation:Normal; Mood/Affect:Normal  A/P:  1. Acne Vulgaris   Continue isotretinoin 30 mg twice daily with food.   Standing CBC, urine pregnancy, CMP and fasting lipids  Ipledge reviewed with patient and Ipledge consent form complete  Patient place in ipledge system  Contraception: 1. Nexplanon 2. Partner's vasectomy   Current Dosage: 1800 mg   Ipledge: [id number]  Return to clinic 30 days  Dry lips and mouth, minor swelling of the eyelids or lips, crusty skin, nosebleeds, GI upset, or thinning of hair may occur. If any of these effects persist or worsen, tell your doctor or pharmacist promptly.   To relieve dry mouth, suck on (sugarless) hard candy or ice chips, chew (sugarless) gum, drink water.   Remember that your doctor has prescribed this medication because he or she has judged that the benefit to you is greater  than the risk of side effects. Many people using this medication do not have serious side effects.   Contact office immediately if you have any of these unlikely but serious side effects: mental/mood changes (e.g., depression,  aggressive or violent behavior, and in rare cases, thoughts of suicide), tingling feeling in the skin, quick/severe sun sensitivity, back/joint/muscle pain, signs of infection (e.g., fever, persistent sore throat, painful swallowing, peeling skin on palms/soles.   Isotretinoin may infrequently cause disease of the pancreatitis, that may rarely be fatal. Stop taking this medication and contact office immediately if you develop: severe stomach pain severe or persistent GI upset,   Stop taking this medication and tell your doctor immediately if you develop these unlikely but very serious side effects: severe headache, vision changes, ear ringing, hearling loss, chest pain, yellowing eyes, skin, dark urine, severe diarrhea, rectal bleeding,   Seek immediate medical attention if you notice any symptoms of a serious allergic reaction.      Accutane is discussed fully with the patient. It is a very effective drug to treat acne vulgaris but has many potential significant side effects. Chief among these are teratogensis, hepatic injury, dyslipidemia and severe drying of the mucous membranes. All of these issues have been discussed in details. Monthly blood tests to monitor lipids and liver functions will be necessary. Expect painful dryness and/or fissuring around the lips, eyes, and other moist areas of the body. Balms may be protective. Contact lens may be too painful to wear temporarily while on this drug. Episodes of significant depression have been reported, including suicidal ideation and attempts in rare cases. It may also cause pseudotumor cerebri and hyperostosis. The patient will report any such changes in mood, depressive symptoms or suicidal thoughts, headaches, joint or bone pains. There  is also a possible association with inflammatory bowel disease, although this is unproven at this point.

## 2018-02-06 NOTE — MR AVS SNAPSHOT
After Visit Summary   2/6/2018    Vivian De La Torre    MRN: [id number]           Patient Information     Date Of Birth          1990        Visit Information        Provider Department      2/6/2018 3:40 PM Jennifer Hinton PA-C [facility]        Today's Diagnoses     Acne vulgaris           Follow-ups after your visit        Your next 10 appointments already scheduled     Mar 06, 2018  3:40 PM CST   [facility] with [facility] H[facility] PA-C   [facility]    [address] [address]   [phone]            Apr 03, 2018  3:40 PM CDT   Return Visit with Jennifer Hinton PA-C   [facility]    [address] [address]   [phone]            May 01, 2018  3:40 PM CDT   Return Visit with Jennifer Hinton PA-C   [facility]    [address] [address]   [phone]            May 29, 2018  3:40 PM CDT   Return Visit with Jennifer Hinton PA-C   [facility]    [address] [address]   [phone]              Who to contact     If you have questions or need follow up information about today's clinic visit or your schedule please contact [facility] directly at [phone].  Normal or non-critical lab and imaging results will be communicated to you by [employer], letter or phone within 4 business days after the clinic has received the results. If you do not hear from us within 7 days, please contact the clinic through MyChart or phone. If you have a critical or abnormal lab result, we will notify you by phone as soon as possible.  Submit refill requests through [employer] or call your pharmacy and they will forward the refill request to us. Please allow 3 business days for your refill to be completed.  "         Additional Information About Your Visit        MyChart Information     SCM-GL lets you send messages to your doctor, view your test results, renew your prescriptions, schedule appointments and more. To sign up, go to [email or web address] . Click on \"Log in\" on the left side of the screen, which will take you to the Welcome page. Then click on \"Sign up Now\" on the right side of the page.     You will be asked to enter the access code listed below, as well as some personal information. Please follow the directions to create your username and password.     Your access code is: [email or web address]  Expires: 2018  3:58 PM     Your access code will  in 90 days. If you need help or a new code, please call your [place] clinic or [phone].        Care EveryWhere ID     This is your Care EveryWhere ID. This could be used by other organizations to access your [place] medical records  [id number]        Your Vitals Were     Pulse Pulse Oximetry                92 98%           Blood Pressure from Last 3 Encounters:   18 142/85   18 131/89   17 148/85    Weight from Last 3 Encounters:   16 64 kg (141 lb)   03/15/16 64 kg (141 lb)   05/20/15 72 kg (158 lb 12.8 oz)              Today, you had the following     No orders found for display         Where to get your medicines      These medications were sent to [facility]- - [address] [address]     Phone:  [phone]     ISOtretinoin 30 MG Caps          Primary Care Provider Fax #    Physician No Ref-Primary [phone]       No address on file        Equal Access to Services     RENITA BAJWA : Hadii mandi Johnston, washellyda luqadaha, qaybta kaalmada adealexandria, pallavi bonilla. So [facility] [phone].    ATENCIÓN: Si habla español, tiene a sykes disposición servicios gratuitos de asistencia lingüística. Llame al " [phone].    We comply with applicable federal civil rights laws and [place] laws. We do not discriminate on the basis of race, color, national origin, age, disability, sex, sexual orientation, or gender identity.            Thank you!     Thank you for choosing [facility]  for your care. Our goal is always to provide you with excellent care. Hearing back from our patients is one way we can continue to improve our services. Please take a few minutes to complete the written survey that you may receive in the mail after your visit with us. Thank you!             Your Updated Medication List - Protect others around you: Learn how to safely use, store and throw away your medicines at www.disposemymeds.org.          This list is accurate as of 2/6/18  3:58 PM.  Always use your most recent med list.                   Brand Name Dispense Instructions for use Diagnosis    acyclovir 400 MG tablet    ZOVIRAX    21 tablet    Take 1 tablet (400 mg) by mouth 3 times daily Uses only for outbreaks    HSV-2 infection       clindamycin 1 % lotion    CLINDAMAX    60 mL    Apply twice daily to face.    Acne vulgaris       cyclobenzaprine 5 MG tablet    FLEXERIL    42 tablet    Take 1 tablet (5 mg) by mouth 3 times daily as needed for muscle spasms    SI (sacroiliac) joint dysfunction       ISOtretinoin 30 MG Caps     60 capsule    Take 1 capsule by mouth 2 times daily (with meals)    Acne vulgaris       NEXPLANON SC

## 2018-02-07 DIAGNOSIS — B00.9 HSV-2 INFECTION: ICD-10-CM

## 2018-02-07 NOTE — TELEPHONE ENCOUNTER
"Requested Prescriptions   Pending Prescriptions Disp Refills     acyclovir (ZOVIRAX) 400 MG tablet [Pharmacy Med Name: ACYCLOVIR 400 MG TABLET] 21 tablet      Sig: Take 1 tablet (400 mg) by mouth 3 times daily Uses only for outbreaks    Antivirals for Herpes Protocol Failed    2/7/2018 10:21 AM       Failed - Recent or future visit with authorizing provider's specialty    Patient had office visit in the last year or has a visit in the next 30 days with authorizing provider.  See \"Patient Info\" tab in inbasket, or \"Choose Columns\" in Meds & Orders section of the refill encounter.     Last Written Prescription Date:  9/15/17  Last Fill Quantity: 21,  # refills: 0   Last Office Visit with [facility] provider:  3/22/17   Future Office Visit:    Next 5 appointments (look out 90 days)     Mar 06, 2018  3:40 PM CST   Return Visit with Jennifer Hinton PA-C   [facility]    [address] [address]   [phone]            Apr 03, 2018  3:40 PM CDT   Return Visit with Jennifer Hinton PA-C   [facility]    [address] [address]   [phone]            May 01, 2018  3:40 PM CDT   Return Visit with Jennifer Hinton PA-C   [facility]    [address] [address]   [phone]                            Passed - Patient is age 12 or older       Passed - Normal serum creatinine on file in past 12 months    Recent Labs   Lab Test  02/06/18   1517   CR  0.62               "

## 2018-02-08 RX ORDER — ACYCLOVIR 400 MG/1
TABLET ORAL
Qty: 21 TABLET | Refills: 0 | Status: SHIPPED | OUTPATIENT
Start: 2018-02-08 | End: 2019-01-03

## 2018-03-06 ENCOUNTER — OFFICE VISIT (OUTPATIENT)
Dept: DERMATOLOGY | Facility: CLINIC | Age: 28
End: 2018-03-06
Payer: COMMERCIAL

## 2018-03-06 VITALS — SYSTOLIC BLOOD PRESSURE: 130 MMHG | DIASTOLIC BLOOD PRESSURE: 80 MMHG | HEART RATE: 83 BPM | OXYGEN SATURATION: 97 %

## 2018-03-06 DIAGNOSIS — L70.0 ACNE VULGARIS: Primary | ICD-10-CM

## 2018-03-06 DIAGNOSIS — L70.0 ACNE VULGARIS: ICD-10-CM

## 2018-03-06 LAB
ALBUMIN SERPL-MCNC: 4.3 G/DL (ref 3.4–5)
ALP SERPL-CCNC: 90 U/L (ref 40–150)
ALT SERPL W P-5'-P-CCNC: 36 U/L (ref 0–50)
ANION GAP SERPL CALCULATED.3IONS-SCNC: 8 MMOL/L (ref 3–14)
AST SERPL W P-5'-P-CCNC: 24 U/L (ref 0–45)
BETA HCG QUAL IFA URINE: NEGATIVE
BILIRUB SERPL-MCNC: 0.3 MG/DL (ref 0.2–1.3)
BUN SERPL-MCNC: 10 MG/DL (ref 7–30)
CALCIUM SERPL-MCNC: 8.8 MG/DL (ref 8.5–10.1)
CHLORIDE SERPL-SCNC: 105 MMOL/L (ref 94–109)
CHOLEST SERPL-MCNC: 168 MG/DL
CO2 SERPL-SCNC: 25 MMOL/L (ref 20–32)
CREAT SERPL-MCNC: 0.64 MG/DL (ref 0.52–1.04)
ERYTHROCYTE [DISTWIDTH] IN BLOOD BY AUTOMATED COUNT: 11.2 % (ref 10–15)
GFR SERPL CREATININE-BSD FRML MDRD: >90 ML/MIN/1.7M2
GLUCOSE SERPL-MCNC: 90 MG/DL (ref 70–99)
HCT VFR BLD AUTO: 41.3 % (ref 35–47)
HDLC SERPL-MCNC: 39 MG/DL
HGB BLD-MCNC: 14.3 G/DL (ref 11.7–15.7)
LDLC SERPL CALC-MCNC: 107 MG/DL
MCH RBC QN AUTO: 32.6 PG (ref 26.5–33)
MCHC RBC AUTO-ENTMCNC: 34.6 G/DL (ref 31.5–36.5)
MCV RBC AUTO: 94 FL (ref 78–100)
NONHDLC SERPL-MCNC: 129 MG/DL
PLATELET # BLD AUTO: 223 10E9/L (ref 150–450)
POTASSIUM SERPL-SCNC: 3.5 MMOL/L (ref 3.4–5.3)
PROT SERPL-MCNC: 7.9 G/DL (ref 6.8–8.8)
RBC # BLD AUTO: 4.38 10E12/L (ref 3.8–5.2)
SODIUM SERPL-SCNC: 138 MMOL/L (ref 133–144)
TRIGL SERPL-MCNC: 110 MG/DL
WBC # BLD AUTO: 9.7 10E9/L (ref 4–11)

## 2018-03-06 PROCEDURE — 80053 COMPREHEN METABOLIC PANEL: CPT | Performed by: PHYSICIAN ASSISTANT

## 2018-03-06 PROCEDURE — 36415 COLL VENOUS BLD VENIPUNCTURE: CPT | Performed by: PHYSICIAN ASSISTANT

## 2018-03-06 PROCEDURE — 85027 COMPLETE CBC AUTOMATED: CPT | Performed by: PHYSICIAN ASSISTANT

## 2018-03-06 PROCEDURE — 99213 OFFICE O/P EST LOW 20 MIN: CPT | Performed by: PHYSICIAN ASSISTANT

## 2018-03-06 PROCEDURE — 84703 CHORIONIC GONADOTROPIN ASSAY: CPT | Performed by: PHYSICIAN ASSISTANT

## 2018-03-06 PROCEDURE — 80061 LIPID PANEL: CPT | Performed by: PHYSICIAN ASSISTANT

## 2018-03-06 RX ORDER — ISOTRETINOIN 40 MG/1
40 CAPSULE ORAL 2 TIMES DAILY WITH MEALS
Qty: 60 CAPSULE | Refills: 0 | Status: SHIPPED | OUTPATIENT
Start: 2018-03-06 | End: 2018-04-03

## 2018-03-06 NOTE — MR AVS SNAPSHOT
After Visit Summary   3/6/2018    Vivian De La Torre    MRN: [id number]           Patient Information     Date Of Birth          1990        Visit Information        Provider Department      3/6/2018 3:40 PM Jennifer Hinton PA-C [facility]        Today's Diagnoses     Acne vulgaris    -  1       Follow-ups after your visit        Your next 10 appointments already scheduled     Apr 03, 2018  3:10 PM CDT   LAB with [facility])    [address] [address]   [phone]           Please do not eat 10-12 hours before your appointment if you are coming in fasting for labs on lipids, cholesterol, or glucose (sugar). This does not apply to pregnant women. Water, hot tea and black coffee (with nothing added) are okay. Do not drink other fluids, diet soda or chew gum.            Apr 03, 2018  3:40 PM CDT   Return Visit with Jennifer Hinton PA-C   [facility]    [address] [address]   [phone]            May 01, 2018  3:10 PM CDT   LAB with [facility] ([facility])    [address] [address]   [phone]           Please do not eat 10-12 hours before your appointment if you are coming in fasting for labs on lipids, cholesterol, or glucose (sugar). This does not apply to pregnant women. Water, hot tea and black coffee (with nothing added) are okay. Do not drink other fluids, diet soda or chew gum.            May 01, 2018  3:40 PM CDT   Return Visit with Jennifer Hinton PA-C   [facility]    [address] [address]   [phone]            May 29, 2018  3:10 PM CDT   LAB with [facility] ([facility])    [address] [address]   [phone]            "Please do not eat 10-12 hours before your appointment if you are coming in fasting for labs on lipids, cholesterol, or glucose (sugar). This does not apply to pregnant women. Water, hot tea and black coffee (with nothing added) are okay. Do not drink other fluids, diet soda or chew gum.            May 29, 2018  3:40 PM CDT   Return Visit with Jennifer Hinton PA-C   [facility]    [address] [address] [phone]              Who to contact     If you have questions or need follow up information about today's clinic visit or your schedule please contact [facility] directly at [phone].  Normal or non-critical lab and imaging results will be communicated to you by MyChart, letter or phone within 4 business days after the clinic has received the results. If you do not hear from us within 7 days, please contact the clinic through MyChart or phone. If you have a critical or abnormal lab result, we will notify you by phone as soon as possible.  Submit refill requests through [employer]ine or call your pharmacy and they will forward the refill request to us. Please allow 3 business days for your refill to be completed.          Additional Information About Your Visit        RealityMine Information     RealityMine lets you send messages to your doctor, view your test results, renew your prescriptions, schedule appointments and more. To sign up, go to [email or web address] . Click on \"Log in\" on the left side of the screen, which will take you to the Welcome page. Then click on \"Sign up Now\" on the right side of the page.     You will be asked to enter the access code listed below, as well as some personal information. Please follow the directions to create your username and password.     Your access code is: [email or web address]  Expires: 2018  3:58 PM     Your access code will  in 90 days. If you need help or a new code, please call your " [facility] or [phone].        Care EveryWhere ID     This is your Care EveryWhere ID. This could be used by other organizations to access your [place] medical records  [id number]        Your Vitals Were     Pulse Pulse Oximetry                83 97%           Blood Pressure from Last 3 Encounters:   03/06/18 130/80   02/06/18 142/85   01/09/18 131/89    Weight from Last 3 Encounters:   03/22/16 64 kg (141 lb)   03/15/16 64 kg (141 lb)   05/20/15 72 kg (158 lb 12.8 oz)              Today, you had the following     No orders found for display         Today's Medication Changes          These changes are accurate as of 3/6/18 10:51 PM.  If you have any questions, ask your nurse or doctor.               These medicines have changed or have updated prescriptions.        Dose/Directions    * ISOtretinoin 30 MG Caps   This may have changed:  Another medication with the same name was added. Make sure you understand how and when to take each.   Used for:  Acne vulgaris   Changed by:  Jennifer Hinton PA-C        Dose:  1 capsule   Take 1 capsule by mouth 2 times daily (with meals)   Quantity:  60 capsule   Refills:  0       * ISOtretinoin 40 MG capsule   Commonly known as:  ACCUTANE   This may have changed:  You were already taking a medication with the same name, and this prescription was added. Make sure you understand how and when to take each.   Used for:  Acne vulgaris   Changed by:  Jennifer Hinton PA-C        Dose:  40 mg   Take 1 capsule (40 mg) by mouth 2 times daily (with meals)   Quantity:  60 capsule   Refills:  0       * Notice:  This list has 2 medication(s) that are the same as other medications prescribed for you. Read the directions carefully, and ask your doctor or other care provider to review them with you.         Where to get your medicines      These medications were sent to [facility] - [place], [place] - [address] [place] [address]  [place] [address]     Phone:  [phone]     ISOtretinoin 40 MG capsule                Primary Care Provider Fax #    Physician No Ref-Primary [phone]       No address on file        Equal Access to Services     MARTIN BAJWA : Hadii aad ku hadbrien Johnston, roger atkinson, lincoln leyva, pallavi easton laAlbertolinda bonilla. So [facility] [phone].    ATENCIÓN: Si habla español, tiene a sykes disposición servicios gratuitos de asistencia lingüística. Llame al [phone].    We comply with applicable federal civil rights laws and [place] laws. We do not discriminate on the basis of race, color, national origin, age, disability, sex, sexual orientation, or gender identity.            Thank you!     Thank you for choosing [facility]  for your care. Our goal is always to provide you with excellent care. Hearing back from our patients is one way we can continue to improve our services. Please take a few minutes to complete the written survey that you may receive in the mail after your visit with us. Thank you!             Your Updated Medication List - Protect others around you: Learn how to safely use, store and throw away your medicines at www.disposemymeds.org.          This list is accurate as of 3/6/18 10:51 PM.  Always use your most recent med list.                   Brand Name Dispense Instructions for use Diagnosis    acyclovir 400 MG tablet    ZOVIRAX    21 tablet    Take 1 tablet (400 mg) by mouth 3 times daily Uses only for outbreaks    HSV-2 infection       clindamycin 1 % lotion    CLINDAMAX    60 mL    Apply twice daily to face.    Acne vulgaris       cyclobenzaprine 5 MG tablet    FLEXERIL    42 tablet    Take 1 tablet (5 mg) by mouth 3 times daily as needed for muscle spasms    SI (sacroiliac) joint dysfunction       * ISOtretinoin 30 MG Caps     60 capsule    Take 1 capsule by mouth 2 times daily (with meals)    Acne vulgaris       * ISOtretinoin 40 MG capsule     ACCUTANE    60 capsule    Take 1 capsule (40 mg) by mouth 2 times daily (with meals)    Acne vulgaris       NEXPLANON SC           * Notice:  This list has 2 medication(s) that are the same as other medications prescribed for you. Read the directions carefully, and ask your doctor or other care provider to review them with you.

## 2018-03-06 NOTE — LETTER
3/6/2018         RE: Vivian De La Torre  [address] [facility] [id number]        Dear Colleague,    Thank you for referring your patient, Vivian De La Torre, to the [facility]. Please see a copy of my visit note below.    Vivian De La Torre is a 27 year old year old female patient here today for recheck acne vulgaris. She has finished her third month of isotretinoin and is currently taking 30 mg twice daily. She denies mild dryness and denies any side effects. No mood changes. She notices minor improvements in her skin. Patient has no other skin complaints today.  Remainder of the HPI, Meds, PMH, Allergies, FH, and SH was reviewed in chart.    Pertinent Hx:   Acne Vulgaris   Past Medical History:   Diagnosis Date     ASCUS on Pap smear 8/15/11    HR HPV 66 and HPV 54     Chickenpox      History of colposcopy with cervical biopsy 9/9/11    benign       Past Surgical History:   Procedure Laterality Date     NO HISTORY OF SURGERY          Family History   Problem Relation Age of Onset     Hypertension Father      HEART DISEASE Father      Respiratory Paternal Grandfather      Alcohol/Drug Maternal Grandfather        Social History     Social History     Marital status: Single     Spouse name: N/A     Number of children: N/A     Years of education: N/A     Occupational History     Not on file.     Social History Main Topics     Smoking status: Current Every Day Smoker     Packs/day: 0.10     Last attempt to quit: 2/1/2013     Smokeless tobacco: Never Used      Comment: trying to quit again- 5-20-15     Alcohol use No     Drug use: No     Sexual activity: Yes     Partners: Male     Birth control/ protection: OCP, Pill     Other Topics Concern     Not on file     Social History Narrative       Outpatient Encounter Prescriptions as of 3/6/2018   Medication Sig Dispense Refill     ISOtretinoin (ACCUTANE) 40 MG capsule Take 1 capsule (40 mg) by mouth 2 times daily (with meals) 60 capsule 0      acyclovir (ZOVIRAX) 400 MG tablet Take 1 tablet (400 mg) by mouth 3 times daily Uses only for outbreaks 21 tablet 0     ISOtretinoin 30 MG CAPS Take 1 capsule by mouth 2 times daily (with meals) 60 capsule 0     Etonogestrel (NEXPLANON SC)        clindamycin (CLINDAMAX) 1 % lotion Apply twice daily to face. (Patient not taking: Reported on 1/9/2018) 60 mL 11     cyclobenzaprine (FLEXERIL) 5 MG tablet Take 1 tablet (5 mg) by mouth 3 times daily as needed for muscle spasms 42 tablet 0     No facility-administered encounter medications on file as of 3/6/2018.              Review Of Systems  Skin: As above  Eyes: negative  Ears/Nose/Throat: negative  Respiratory: No shortness of breath, dyspnea on exertion, cough, or hemoptysis  Cardiovascular: negative  Gastrointestinal: negative  Genitourinary: negative  Musculoskeletal: negative  Neurologic: negative  Psychiatric: negative  Hematologic/Lymphatic/Immunologic: negative  Endocrine: negative      O:   NAD, WDWN, Alert & Oriented, Mood & Affect wnl, Vitals stable   Here today alone   /80  Pulse 83  SpO2 97%   General appearance normal   Vitals stable   Alert, oriented and in no acute distress     1+ inflammatory papules on face, pink macules on face       Eyes: Conjunctivae/lids:Normal     ENT: Lips: normal    MSK:Normal    Pulm: Breathing Normal    Neuro/Psych: Orientation:Normal; Mood/Affect:Normal  A/P:  1. Acne Vulgaris   Increase  isotretinoin 40 mg twice daily with food.   Standing CBC, urine pregnancy, CMP and fasting lipids  Ipledge reviewed with patient and Ipledge consent form complete  Patient place in ipledge system  Contraception: 1. Nexplanon 2. Partner's vasectomy   Current Dosage: 4800 mg   Goal Dosage: 12,000 mg   Ipledge: [id number]  Return to clinic 30 days  Dry lips and mouth, minor swelling of the eyelids or lips, crusty skin, nosebleeds, GI upset, or thinning of hair may occur. If any of these effects persist or worsen, tell your doctor or  pharmacist promptly.   To relieve dry mouth, suck on (sugarless) hard candy or ice chips, chew (sugarless) gum, drink water.   Remember that your doctor has prescribed this medication because he or she has judged that the benefit to you is greater than the risk of side effects. Many people using this medication do not have serious side effects.   Contact office immediately if you have any of these unlikely but serious side effects: mental/mood changes (e.g., depression,  aggressive or violent behavior, and in rare cases, thoughts of suicide), tingling feeling in the skin, quick/severe sun sensitivity, back/joint/muscle pain, signs of infection (e.g., fever, persistent sore throat, painful swallowing, peeling skin on palms/soles.   Isotretinoin may infrequently cause disease of the pancreatitis, that may rarely be fatal. Stop taking this medication and contact office immediately if you develop: severe stomach pain severe or persistent GI upset,   Stop taking this medication and tell your doctor immediately if you develop these unlikely but very serious side effects: severe headache, vision changes, ear ringing, hearling loss, chest pain, yellowing eyes, skin, dark urine, severe diarrhea, rectal bleeding,   Seek immediate medical attention if you notice any symptoms of a serious allergic reaction.      Accutane is discussed fully with the patient. It is a very effective drug to treat acne vulgaris but has many potential significant side effects. Chief among these are teratogensis, hepatic injury, dyslipidemia and severe drying of the mucous membranes. All of these issues have been discussed in details. Monthly blood tests to monitor lipids and liver functions will be necessary. Expect painful dryness and/or fissuring around the lips, eyes, and other moist areas of the body. Balms may be protective. Contact lens may be too painful to wear temporarily while on this drug. Episodes of significant depression have been  reported, including suicidal ideation and attempts in rare cases. It may also cause pseudotumor cerebri and hyperostosis. The patient will report any such changes in mood, depressive symptoms or suicidal thoughts, headaches, joint or bone pains. There is also a possible association with inflammatory bowel disease, although this is unproven at this point.        Again, thank you for allowing me to participate in the care of your patient.        Sincerely,        Jennifer Casiano PA-C

## 2018-03-06 NOTE — PROGRESS NOTES
Vivian De La Torre is a 27 year old year old female patient here today for recheck acne vulgaris. She has finished her third month of isotretinoin and is currently taking 30 mg twice daily. She denies mild dryness and denies any side effects. No mood changes. She notices minor improvements in her skin. Patient has no other skin complaints today.  Remainder of the HPI, Meds, PMH, Allergies, FH, and SH was reviewed in chart.    Pertinent Hx:   Acne Vulgaris   Past Medical History:   Diagnosis Date     ASCUS on Pap smear 8/15/11    HR HPV 66 and HPV 54     Chickenpox      History of colposcopy with cervical biopsy 9/9/11    benign       Past Surgical History:   Procedure Laterality Date     NO HISTORY OF SURGERY          Family History   Problem Relation Age of Onset     Hypertension Father      HEART DISEASE Father      Respiratory Paternal Grandfather      Alcohol/Drug Maternal Grandfather        Social History     Social History     Marital status: Single     Spouse name: N/A     Number of children: N/A     Years of education: N/A     Occupational History     Not on file.     Social History Main Topics     Smoking status: Current Every Day Smoker     Packs/day: 0.10     Last attempt to quit: 2/1/2013     Smokeless tobacco: Never Used      Comment: trying to quit again- 5-20-15     Alcohol use No     Drug use: No     Sexual activity: Yes     Partners: Male     Birth control/ protection: OCP, Pill     Other Topics Concern     Not on file     Social History Narrative       Outpatient Encounter Prescriptions as of 3/6/2018   Medication Sig Dispense Refill     ISOtretinoin (ACCUTANE) 40 MG capsule Take 1 capsule (40 mg) by mouth 2 times daily (with meals) 60 capsule 0     acyclovir (ZOVIRAX) 400 MG tablet Take 1 tablet (400 mg) by mouth 3 times daily Uses only for outbreaks 21 tablet 0     ISOtretinoin 30 MG CAPS Take 1 capsule by mouth 2 times daily (with meals) 60 capsule 0     Etonogestrel (NEXPLANON SC)         clindamycin (CLINDAMAX) 1 % lotion Apply twice daily to face. (Patient not taking: Reported on 1/9/2018) 60 mL 11     cyclobenzaprine (FLEXERIL) 5 MG tablet Take 1 tablet (5 mg) by mouth 3 times daily as needed for muscle spasms 42 tablet 0     No facility-administered encounter medications on file as of 3/6/2018.              Review Of Systems  Skin: As above  Eyes: negative  Ears/Nose/Throat: negative  Respiratory: No shortness of breath, dyspnea on exertion, cough, or hemoptysis  Cardiovascular: negative  Gastrointestinal: negative  Genitourinary: negative  Musculoskeletal: negative  Neurologic: negative  Psychiatric: negative  Hematologic/Lymphatic/Immunologic: negative  Endocrine: negative      O:   NAD, WDWN, Alert & Oriented, Mood & Affect wnl, Vitals stable   Here today alone   /80  Pulse 83  SpO2 97%   General appearance normal   Vitals stable   Alert, oriented and in no acute distress     1+ inflammatory papules on face, pink macules on face       Eyes: Conjunctivae/lids:Normal     ENT: Lips: normal    MSK:Normal    Pulm: Breathing Normal    Neuro/Psych: Orientation:Normal; Mood/Affect:Normal  A/P:  1. Acne Vulgaris   Increase  isotretinoin 40 mg twice daily with food.   Standing CBC, urine pregnancy, CMP and fasting lipids  Ipledge reviewed with patient and Ipledge consent form complete  Patient place in ipledge system  Contraception: 1. Nexplanon 2. Partner's vasectomy   Current Dosage: 4800 mg   Goal Dosage: 12,000 mg   Ipledge: [id number]  Return to clinic 30 days  Dry lips and mouth, minor swelling of the eyelids or lips, crusty skin, nosebleeds, GI upset, or thinning of hair may occur. If any of these effects persist or worsen, tell your doctor or pharmacist promptly.   To relieve dry mouth, suck on (sugarless) hard candy or ice chips, chew (sugarless) gum, drink water.   Remember that your doctor has prescribed this medication because he or she has judged that the benefit to you is greater  than the risk of side effects. Many people using this medication do not have serious side effects.   Contact office immediately if you have any of these unlikely but serious side effects: mental/mood changes (e.g., depression,  aggressive or violent behavior, and in rare cases, thoughts of suicide), tingling feeling in the skin, quick/severe sun sensitivity, back/joint/muscle pain, signs of infection (e.g., fever, persistent sore throat, painful swallowing, peeling skin on palms/soles.   Isotretinoin may infrequently cause disease of the pancreatitis, that may rarely be fatal. Stop taking this medication and contact office immediately if you develop: severe stomach pain severe or persistent GI upset,   Stop taking this medication and tell your doctor immediately if you develop these unlikely but very serious side effects: severe headache, vision changes, ear ringing, hearling loss, chest pain, yellowing eyes, skin, dark urine, severe diarrhea, rectal bleeding,   Seek immediate medical attention if you notice any symptoms of a serious allergic reaction.      Accutane is discussed fully with the patient. It is a very effective drug to treat acne vulgaris but has many potential significant side effects. Chief among these are teratogensis, hepatic injury, dyslipidemia and severe drying of the mucous membranes. All of these issues have been discussed in details. Monthly blood tests to monitor lipids and liver functions will be necessary. Expect painful dryness and/or fissuring around the lips, eyes, and other moist areas of the body. Balms may be protective. Contact lens may be too painful to wear temporarily while on this drug. Episodes of significant depression have been reported, including suicidal ideation and attempts in rare cases. It may also cause pseudotumor cerebri and hyperostosis. The patient will report any such changes in mood, depressive symptoms or suicidal thoughts, headaches, joint or bone pains. There  is also a possible association with inflammatory bowel disease, although this is unproven at this point.

## 2018-03-06 NOTE — NURSING NOTE
"Initial /80  Pulse 83  SpO2 97% Estimated body mass index is 23.83 kg/(m^2) as calculated from the following:    Height as of 3/15/16: 1.638 m (5' 4.5\").    Weight as of 3/22/16: 64 kg (141 lb). .      "

## 2018-04-03 ENCOUNTER — OFFICE VISIT (OUTPATIENT)
Dept: DERMATOLOGY | Facility: CLINIC | Age: 28
End: 2018-04-03
Payer: COMMERCIAL

## 2018-04-03 VITALS — DIASTOLIC BLOOD PRESSURE: 73 MMHG | OXYGEN SATURATION: 99 % | HEART RATE: 79 BPM | SYSTOLIC BLOOD PRESSURE: 121 MMHG

## 2018-04-03 DIAGNOSIS — L70.0 ACNE VULGARIS: ICD-10-CM

## 2018-04-03 LAB
ALBUMIN SERPL-MCNC: 4.3 G/DL (ref 3.4–5)
ALP SERPL-CCNC: 99 U/L (ref 40–150)
ALT SERPL W P-5'-P-CCNC: 35 U/L (ref 0–50)
ANION GAP SERPL CALCULATED.3IONS-SCNC: 6 MMOL/L (ref 3–14)
AST SERPL W P-5'-P-CCNC: 28 U/L (ref 0–45)
BETA HCG QUAL IFA URINE: NEGATIVE
BILIRUB SERPL-MCNC: 0.2 MG/DL (ref 0.2–1.3)
BUN SERPL-MCNC: 15 MG/DL (ref 7–30)
CALCIUM SERPL-MCNC: 8.7 MG/DL (ref 8.5–10.1)
CHLORIDE SERPL-SCNC: 106 MMOL/L (ref 94–109)
CHOLEST SERPL-MCNC: 192 MG/DL
CO2 SERPL-SCNC: 27 MMOL/L (ref 20–32)
CREAT SERPL-MCNC: 0.66 MG/DL (ref 0.52–1.04)
ERYTHROCYTE [DISTWIDTH] IN BLOOD BY AUTOMATED COUNT: 11.2 % (ref 10–15)
GFR SERPL CREATININE-BSD FRML MDRD: >90 ML/MIN/1.7M2
GLUCOSE SERPL-MCNC: 81 MG/DL (ref 70–99)
HCT VFR BLD AUTO: 42.1 % (ref 35–47)
HDLC SERPL-MCNC: 38 MG/DL
HGB BLD-MCNC: 14.5 G/DL (ref 11.7–15.7)
LDLC SERPL CALC-MCNC: 130 MG/DL
MCH RBC QN AUTO: 32.4 PG (ref 26.5–33)
MCHC RBC AUTO-ENTMCNC: 34.4 G/DL (ref 31.5–36.5)
MCV RBC AUTO: 94 FL (ref 78–100)
NONHDLC SERPL-MCNC: 154 MG/DL
PLATELET # BLD AUTO: 226 10E9/L (ref 150–450)
POTASSIUM SERPL-SCNC: 3.6 MMOL/L (ref 3.4–5.3)
PROT SERPL-MCNC: 8 G/DL (ref 6.8–8.8)
RBC # BLD AUTO: 4.47 10E12/L (ref 3.8–5.2)
SODIUM SERPL-SCNC: 139 MMOL/L (ref 133–144)
TRIGL SERPL-MCNC: 120 MG/DL
WBC # BLD AUTO: 8.1 10E9/L (ref 4–11)

## 2018-04-03 PROCEDURE — 85027 COMPLETE CBC AUTOMATED: CPT | Performed by: PHYSICIAN ASSISTANT

## 2018-04-03 PROCEDURE — 99213 OFFICE O/P EST LOW 20 MIN: CPT | Performed by: PHYSICIAN ASSISTANT

## 2018-04-03 PROCEDURE — 80061 LIPID PANEL: CPT | Performed by: PHYSICIAN ASSISTANT

## 2018-04-03 PROCEDURE — 36415 COLL VENOUS BLD VENIPUNCTURE: CPT | Performed by: PHYSICIAN ASSISTANT

## 2018-04-03 PROCEDURE — 84703 CHORIONIC GONADOTROPIN ASSAY: CPT | Performed by: PHYSICIAN ASSISTANT

## 2018-04-03 PROCEDURE — 80053 COMPREHEN METABOLIC PANEL: CPT | Performed by: PHYSICIAN ASSISTANT

## 2018-04-03 RX ORDER — ISOTRETINOIN 40 MG/1
40 CAPSULE ORAL 2 TIMES DAILY WITH MEALS
Qty: 60 CAPSULE | Refills: 0 | Status: SHIPPED | OUTPATIENT
Start: 2018-04-03 | End: 2018-05-01

## 2018-04-03 NOTE — NURSING NOTE
"Initial /73  Pulse 79  SpO2 99% Estimated body mass index is 23.83 kg/(m^2) as calculated from the following:    Height as of 3/15/16: 1.638 m (5' 4.5\").    Weight as of 3/22/16: 64 kg (141 lb). .      "

## 2018-04-03 NOTE — PROGRESS NOTES
Vivian De La Torre is a 27 year old year old female patient here today for recheck acne vulgaris. She has finished her fourth month of isotretinoin and is currently taking 40 mg twice daily. She denies mild dryness and denies any side effects. She noticed an increase sensitivity to bright sun light and wind. However denies any decreased vision.  No mood changes. She noticed more improvements with her acne this month.  Patient has no other skin complaints today.  Remainder of the HPI, Meds, PMH, Allergies, FH, and SH was reviewed in chart.    Pertinent Hx:   Acne Vulgaris   Past Medical History:   Diagnosis Date     ASCUS on Pap smear 8/15/11    HR HPV 66 and HPV 54     Chickenpox      History of colposcopy with cervical biopsy 9/9/11    benign       Past Surgical History:   Procedure Laterality Date     NO HISTORY OF SURGERY          Family History   Problem Relation Age of Onset     Hypertension Father      HEART DISEASE Father      Respiratory Paternal Grandfather      Alcohol/Drug Maternal Grandfather        Social History     Social History     Marital status: Single     Spouse name: N/A     Number of children: N/A     Years of education: N/A     Occupational History     Not on file.     Social History Main Topics     Smoking status: Current Every Day Smoker     Packs/day: 0.10     Last attempt to quit: 2/1/2013     Smokeless tobacco: Never Used      Comment: trying to quit again- 5-20-15     Alcohol use No     Drug use: No     Sexual activity: Yes     Partners: Male     Birth control/ protection: OCP, Pill     Other Topics Concern     Not on file     Social History Narrative       Outpatient Encounter Prescriptions as of 4/3/2018   Medication Sig Dispense Refill     ISOtretinoin (ACCUTANE) 40 MG capsule Take 1 capsule (40 mg) by mouth 2 times daily (with meals) 60 capsule 0     acyclovir (ZOVIRAX) 400 MG tablet Take 1 tablet (400 mg) by mouth 3 times daily Uses only for outbreaks 21 tablet 0     Etonogestrel  (NEXPLANON SC)        cyclobenzaprine (FLEXERIL) 5 MG tablet Take 1 tablet (5 mg) by mouth 3 times daily as needed for muscle spasms 42 tablet 0     [DISCONTINUED] ISOtretinoin (ACCUTANE) 40 MG capsule Take 1 capsule (40 mg) by mouth 2 times daily (with meals) 60 capsule 0     clindamycin (CLINDAMAX) 1 % lotion Apply twice daily to face. (Patient not taking: Reported on 1/9/2018) 60 mL 11     No facility-administered encounter medications on file as of 4/3/2018.              Review Of Systems  Skin: As above  Eyes: negative  Ears/Nose/Throat: negative  Respiratory: No shortness of breath, dyspnea on exertion, cough, or hemoptysis  Cardiovascular: negative  Gastrointestinal: negative  Genitourinary: negative  Musculoskeletal: negative  Neurologic: negative  Psychiatric: negative  Hematologic/Lymphatic/Immunologic: negative  Endocrine: negative      O:   NAD, WDWN, Alert & Oriented, Mood & Affect wnl, Vitals stable   Here today alone   /73  Pulse 79  SpO2 99%   General appearance normal   Vitals stable   Alert, oriented and in no acute distress     Pink macules, rare inflammatory papules on face       Eyes: Conjunctivae/lids:Normal     ENT: Lips: normal    MSK:Normal    Cardiovascular: peripheral edema none    Pulm: Breathing Normal    Neuro/Psych: Orientation:Normal; Mood/Affect:Normal    A/P:  1. Acne Vulgaris   Continue isotretinoin 40 mg twice daily with food.   Standing CBC, urine pregnancy, CMP and fasting lipids  Ipledge reviewed with patient and Ipledge consent form complete  Patient place in ipledge system  Contraception: 1. Nexplanon 2. Partner's vasectomy   Current Dosage: 7200 mg   Goal Dosage: 12,000 mg   Ipledge: [id number]  Return to clinic 30 days  Dry lips and mouth, minor swelling of the eyelids or lips, crusty skin, nosebleeds, GI upset, or thinning of hair may occur. If any of these effects persist or worsen, tell your doctor or pharmacist promptly.   To relieve dry mouth, suck on  (sugarless) hard candy or ice chips, chew (sugarless) gum, drink water.   Remember that your doctor has prescribed this medication because he or she has judged that the benefit to you is greater than the risk of side effects. Many people using this medication do not have serious side effects.   Contact office immediately if you have any of these unlikely but serious side effects: mental/mood changes (e.g., depression,  aggressive or violent behavior, and in rare cases, thoughts of suicide), tingling feeling in the skin, quick/severe sun sensitivity, back/joint/muscle pain, signs of infection (e.g., fever, persistent sore throat, painful swallowing, peeling skin on palms/soles.   Isotretinoin may infrequently cause disease of the pancreatitis, that may rarely be fatal. Stop taking this medication and contact office immediately if you develop: severe stomach pain severe or persistent GI upset,   Stop taking this medication and tell your doctor immediately if you develop these unlikely but very serious side effects: severe headache, vision changes, ear ringing, hearling loss, chest pain, yellowing eyes, skin, dark urine, severe diarrhea, rectal bleeding,   Seek immediate medical attention if you notice any symptoms of a serious allergic reaction.      Accutane is discussed fully with the patient. It is a very effective drug to treat acne vulgaris but has many potential significant side effects. Chief among these are teratogensis, hepatic injury, dyslipidemia and severe drying of the mucous membranes. All of these issues have been discussed in details. Monthly blood tests to monitor lipids and liver functions will be necessary. Expect painful dryness and/or fissuring around the lips, eyes, and other moist areas of the body. Balms may be protective. Contact lens may be too painful to wear temporarily while on this drug. Episodes of significant depression have been reported, including suicidal ideation and attempts in  rare cases. It may also cause pseudotumor cerebri and hyperostosis. The patient will report any such changes in mood, depressive symptoms or suicidal thoughts, headaches, joint or bone pains. There is also a possible association with inflammatory bowel disease, although this is unproven at this point.

## 2018-04-03 NOTE — MR AVS SNAPSHOT
After Visit Summary   4/3/2018    Vivian De La Torre    MRN: [id number]           Patient Information     Date Of Birth          1990        Visit Information        Provider Department      4/3/2018 3:40 PM Jennifer Hinton PA-C [facility]        Today's Diagnoses     Acne vulgaris           Follow-ups after your visit        Your next 10 appointments already scheduled     Apr 03, 2018  3:40 PM CDT   Return Visit with Jennifer Hinton PA-C   [facility] ([facility]    [address] [address]   [phone]            May 01, 2018  3:10 PM CDT   LAB with [facility] ([facility])    [address] [address]   [phone]           Please do not eat 10-12 hours before your appointment if you are coming in fasting for labs on lipids, cholesterol, or glucose (sugar). This does not apply to pregnant women. Water, hot tea and black coffee (with nothing added) are okay. Do not drink other fluids, diet soda or chew gum.            May 01, 2018  3:40 PM CDT   Return Visit with Jennifer Hinton PA-C   [facility]    [address] [address]   [phone]            May 29, 2018  3:10 PM CDT   LAB with [facility]   [facility]    [address] [address]   [phone]           Please do not eat 10-12 hours before your appointment if you are coming in fasting for labs on lipids, cholesterol, or glucose (sugar). This does not apply to pregnant women. Water, hot tea and black coffee (with nothing added) are okay. Do not drink other fluids, diet soda or chew gum.            May 29, 2018  3:40 PM CDT   Return Visit with Jennifer Hinton PA-C   [facility] ([facility]    [address] [address]  "  [phone]              Who to contact     If you have questions or need follow up information about today's clinic visit or your schedule please contact [facility] directly at [phone].  Normal or non-critical lab and imaging results will be communicated to you by MyChart, letter or phone within 4 business days after the clinic has received the results. If you do not hear from us within 7 days, please contact the clinic through MyChart or phone. If you have a critical or abnormal lab result, we will notify you by phone as soon as possible.  Submit refill requests through [email or web address] or call your pharmacy and they will forward the refill request to us. Please allow 3 business days for your refill to be completed.          Additional Information About Your Visit        [email or web address] Information     [email or web address] lets you send messages to your doctor, view your test results, renew your prescriptions, schedule appointments and more. To sign up, go to [email or web address] . Click on \"Log in\" on the left side of the screen, which will take you to the Welcome page. Then click on \"Sign up Now\" on the right side of the page.     You will be asked to enter the access code listed below, as well as some personal information. Please follow the directions to create your username and password.     Your access code is: [email or web address]  Expires: 2018  4:58 PM     Your access code will  in 90 days. If you need help or a new code, please call your [place] clinic or [phone].        Care EveryWhere ID     This is your Care EveryWhere ID. This could be used by other organizations to access your [place] medical records  [id number]        Your Vitals Were     Pulse Pulse Oximetry                79 99%           Blood Pressure from Last 3 Encounters:   18 121/73   18 130/80   18 142/85    Weight from Last 3 Encounters:   16 64 kg (141 lb)   03/15/16 64 kg (141 lb)   05/20/15 72 kg (158 lb " 12.8 oz)              Today, you had the following     No orders found for display         Where to get your medicines      These medications were sent to [facility] - [address] [address]     Phone:  [phone]     ISOtretinoin 40 MG capsule          Primary Care Provider Fax #    Physician No Ref-Primary [phone]       No address on file        Equal Access to Services     MARTIN BAJWA : Hadii aad ku hadasho Soomaali, waaxda luqadaha, qaybta kaalmada adeegyada, waxay idiin hayarmidan neelima khdentonsh laagnieszka bonilla. So [facility] [phone].    ATENCIÓN: Si hablloyd salas, tiene a sykes disposición servicios gratuitos de asistencia lingüística. Llame al [phone].    We comply with applicable federal civil rights laws and [place] laws. We do not discriminate on the basis of race, color, national origin, age, disability, sex, sexual orientation, or gender identity.            Thank you!     Thank you for choosing [facility]  for your care. Our goal is always to provide you with excellent care. Hearing back from our patients is one way we can continue to improve our services. Please take a few minutes to complete the written survey that you may receive in the mail after your visit with us. Thank you!             Your Updated Medication List - Protect others around you: Learn how to safely use, store and throw away your medicines at www.disposemymeds.org.          This list is accurate as of 4/3/18  3:29 PM.  Always use your most recent med list.                   Brand Name Dispense Instructions for use Diagnosis    acyclovir 400 MG tablet    ZOVIRAX    21 tablet    Take 1 tablet (400 mg) by mouth 3 times daily Uses only for outbreaks    HSV-2 infection       clindamycin 1 % lotion    CLINDAMAX    60 mL    Apply twice daily to face.    Acne vulgaris       cyclobenzaprine 5 MG tablet    FLEXERIL    42 tablet    Take 1 tablet (5 mg) by  mouth 3 times daily as needed for muscle spasms    SI (sacroiliac) joint dysfunction       ISOtretinoin 40 MG capsule    ACCUTANE    60 capsule    Take 1 capsule (40 mg) by mouth 2 times daily (with meals)    Acne vulgaris       NEXPLANON SC

## 2018-04-03 NOTE — LETTER
4/3/2018         RE: Vivian De La Torre  [address] [address]        Dear Colleague,    Thank you for referring your patient, Vivian De La Torre, to the [facility]. Please see a copy of my visit note below.    Vivian De La Torre is a 27 year old year old female patient here today for recheck acne vulgaris. She has finished her fourth month of isotretinoin and is currently taking 40 mg twice daily. She denies mild dryness and denies any side effects. She noticed an increase sensitivity to bright sun light and wind. However denies any decreased vision.  No mood changes. She noticed more improvements with her acne this month.  Patient has no other skin complaints today.  Remainder of the HPI, Meds, PMH, Allergies, FH, and SH was reviewed in chart.    Pertinent Hx:   Acne Vulgaris   Past Medical History:   Diagnosis Date     ASCUS on Pap smear 8/15/11    HR HPV 66 and HPV 54     Chickenpox      History of colposcopy with cervical biopsy 9/9/11    benign       Past Surgical History:   Procedure Laterality Date     NO HISTORY OF SURGERY          Family History   Problem Relation Age of Onset     Hypertension Father      HEART DISEASE Father      Respiratory Paternal Grandfather      Alcohol/Drug Maternal Grandfather        Social History     Social History     Marital status: Single     Spouse name: N/A     Number of children: N/A     Years of education: N/A     Occupational History     Not on file.     Social History Main Topics     Smoking status: Current Every Day Smoker     Packs/day: 0.10     Last attempt to quit: 2/1/2013     Smokeless tobacco: Never Used      Comment: trying to quit again- 5-20-15     Alcohol use No     Drug use: No     Sexual activity: Yes     Partners: Male     Birth control/ protection: OCP, Pill     Other Topics Concern     Not on file     Social History Narrative       Outpatient Encounter Prescriptions as of 4/3/2018   Medication Sig Dispense Refill      ISOtretinoin (ACCUTANE) 40 MG capsule Take 1 capsule (40 mg) by mouth 2 times daily (with meals) 60 capsule 0     acyclovir (ZOVIRAX) 400 MG tablet Take 1 tablet (400 mg) by mouth 3 times daily Uses only for outbreaks 21 tablet 0     Etonogestrel (NEXPLANON SC)        cyclobenzaprine (FLEXERIL) 5 MG tablet Take 1 tablet (5 mg) by mouth 3 times daily as needed for muscle spasms 42 tablet 0     [DISCONTINUED] ISOtretinoin (ACCUTANE) 40 MG capsule Take 1 capsule (40 mg) by mouth 2 times daily (with meals) 60 capsule 0     clindamycin (CLINDAMAX) 1 % lotion Apply twice daily to face. (Patient not taking: Reported on 1/9/2018) 60 mL 11     No facility-administered encounter medications on file as of 4/3/2018.              Review Of Systems  Skin: As above  Eyes: negative  Ears/Nose/Throat: negative  Respiratory: No shortness of breath, dyspnea on exertion, cough, or hemoptysis  Cardiovascular: negative  Gastrointestinal: negative  Genitourinary: negative  Musculoskeletal: negative  Neurologic: negative  Psychiatric: negative  Hematologic/Lymphatic/Immunologic: negative  Endocrine: negative      O:   NAD, WDWN, Alert & Oriented, Mood & Affect wnl, Vitals stable   Here today alone   /73  Pulse 79  SpO2 99%   General appearance normal   Vitals stable   Alert, oriented and in no acute distress     Pink macules, rare inflammatory papules on face       Eyes: Conjunctivae/lids:Normal     ENT: Lips: normal    MSK:Normal    Cardiovascular: peripheral edema none    Pulm: Breathing Normal    Neuro/Psych: Orientation:Normal; Mood/Affect:Normal    A/P:  1. Acne Vulgaris   Continue isotretinoin 40 mg twice daily with food.   Standing CBC, urine pregnancy, CMP and fasting lipids  Ipledge reviewed with patient and Ipledge consent form complete  Patient place in ipledge system  Contraception: 1. Nexplanon 2. Partner's vasectomy   Current Dosage: 7200 mg   Goal Dosage: 12,000 mg   Ipledge: [id number]  Return to clinic 30 days  Dry  lips and mouth, minor swelling of the eyelids or lips, crusty skin, nosebleeds, GI upset, or thinning of hair may occur. If any of these effects persist or worsen, tell your doctor or pharmacist promptly.   To relieve dry mouth, suck on (sugarless) hard candy or ice chips, chew (sugarless) gum, drink water.   Remember that your doctor has prescribed this medication because he or she has judged that the benefit to you is greater than the risk of side effects. Many people using this medication do not have serious side effects.   Contact office immediately if you have any of these unlikely but serious side effects: mental/mood changes (e.g., depression,  aggressive or violent behavior, and in rare cases, thoughts of suicide), tingling feeling in the skin, quick/severe sun sensitivity, back/joint/muscle pain, signs of infection (e.g., fever, persistent sore throat, painful swallowing, peeling skin on palms/soles.   Isotretinoin may infrequently cause disease of the pancreatitis, that may rarely be fatal. Stop taking this medication and contact office immediately if you develop: severe stomach pain severe or persistent GI upset,   Stop taking this medication and tell your doctor immediately if you develop these unlikely but very serious side effects: severe headache, vision changes, ear ringing, hearling loss, chest pain, yellowing eyes, skin, dark urine, severe diarrhea, rectal bleeding,   Seek immediate medical attention if you notice any symptoms of a serious allergic reaction.      Accutane is discussed fully with the patient. It is a very effective drug to treat acne vulgaris but has many potential significant side effects. Chief among these are teratogensis, hepatic injury, dyslipidemia and severe drying of the mucous membranes. All of these issues have been discussed in details. Monthly blood tests to monitor lipids and liver functions will be necessary. Expect painful dryness and/or fissuring around the lips,  eyes, and other moist areas of the body. Balms may be protective. Contact lens may be too painful to wear temporarily while on this drug. Episodes of significant depression have been reported, including suicidal ideation and attempts in rare cases. It may also cause pseudotumor cerebri and hyperostosis. The patient will report any such changes in mood, depressive symptoms or suicidal thoughts, headaches, joint or bone pains. There is also a possible association with inflammatory bowel disease, although this is unproven at this point.        Again, thank you for allowing me to participate in the care of your patient.        Sincerely,        Jennifer Casiano PA-C

## 2018-05-01 ENCOUNTER — OFFICE VISIT (OUTPATIENT)
Dept: DERMATOLOGY | Facility: CLINIC | Age: 28
End: 2018-05-01
Payer: COMMERCIAL

## 2018-05-01 VITALS — DIASTOLIC BLOOD PRESSURE: 83 MMHG | HEART RATE: 101 BPM | OXYGEN SATURATION: 98 % | SYSTOLIC BLOOD PRESSURE: 128 MMHG

## 2018-05-01 DIAGNOSIS — L70.0 ACNE VULGARIS: ICD-10-CM

## 2018-05-01 LAB
ALBUMIN SERPL-MCNC: 4.1 G/DL (ref 3.4–5)
ALP SERPL-CCNC: 113 U/L (ref 40–150)
ALT SERPL W P-5'-P-CCNC: 25 U/L (ref 0–50)
ANION GAP SERPL CALCULATED.3IONS-SCNC: 3 MMOL/L (ref 3–14)
AST SERPL W P-5'-P-CCNC: 24 U/L (ref 0–45)
BETA HCG QUAL IFA URINE: NEGATIVE
BILIRUB SERPL-MCNC: 0.2 MG/DL (ref 0.2–1.3)
BUN SERPL-MCNC: 13 MG/DL (ref 7–30)
CALCIUM SERPL-MCNC: 9.3 MG/DL (ref 8.5–10.1)
CHLORIDE SERPL-SCNC: 107 MMOL/L (ref 94–109)
CHOLEST SERPL-MCNC: 175 MG/DL
CO2 SERPL-SCNC: 28 MMOL/L (ref 20–32)
CREAT SERPL-MCNC: 0.68 MG/DL (ref 0.52–1.04)
ERYTHROCYTE [DISTWIDTH] IN BLOOD BY AUTOMATED COUNT: 11.2 % (ref 10–15)
GFR SERPL CREATININE-BSD FRML MDRD: >90 ML/MIN/1.7M2
GLUCOSE SERPL-MCNC: 106 MG/DL (ref 70–99)
HCT VFR BLD AUTO: 41.7 % (ref 35–47)
HDLC SERPL-MCNC: 32 MG/DL
HGB BLD-MCNC: 14.3 G/DL (ref 11.7–15.7)
LDLC SERPL CALC-MCNC: 101 MG/DL
MCH RBC QN AUTO: 32.1 PG (ref 26.5–33)
MCHC RBC AUTO-ENTMCNC: 34.3 G/DL (ref 31.5–36.5)
MCV RBC AUTO: 94 FL (ref 78–100)
NONHDLC SERPL-MCNC: 143 MG/DL
PLATELET # BLD AUTO: 218 10E9/L (ref 150–450)
POTASSIUM SERPL-SCNC: 3.4 MMOL/L (ref 3.4–5.3)
PROT SERPL-MCNC: 7.8 G/DL (ref 6.8–8.8)
RBC # BLD AUTO: 4.46 10E12/L (ref 3.8–5.2)
SODIUM SERPL-SCNC: 138 MMOL/L (ref 133–144)
TRIGL SERPL-MCNC: 212 MG/DL
WBC # BLD AUTO: 8.3 10E9/L (ref 4–11)

## 2018-05-01 PROCEDURE — 80061 LIPID PANEL: CPT | Performed by: PHYSICIAN ASSISTANT

## 2018-05-01 PROCEDURE — 85027 COMPLETE CBC AUTOMATED: CPT | Performed by: PHYSICIAN ASSISTANT

## 2018-05-01 PROCEDURE — 36415 COLL VENOUS BLD VENIPUNCTURE: CPT | Performed by: PHYSICIAN ASSISTANT

## 2018-05-01 PROCEDURE — 84703 CHORIONIC GONADOTROPIN ASSAY: CPT | Performed by: PHYSICIAN ASSISTANT

## 2018-05-01 PROCEDURE — 80053 COMPREHEN METABOLIC PANEL: CPT | Performed by: PHYSICIAN ASSISTANT

## 2018-05-01 PROCEDURE — 99213 OFFICE O/P EST LOW 20 MIN: CPT | Performed by: PHYSICIAN ASSISTANT

## 2018-05-01 RX ORDER — ISOTRETINOIN 40 MG/1
40 CAPSULE ORAL 2 TIMES DAILY WITH MEALS
Qty: 60 CAPSULE | Refills: 0 | Status: SHIPPED | OUTPATIENT
Start: 2018-05-01 | End: 2018-06-03

## 2018-05-01 NOTE — NURSING NOTE
"Initial /83  Pulse 101  SpO2 98% Estimated body mass index is 23.83 kg/(m^2) as calculated from the following:    Height as of 3/15/16: 1.638 m (5' 4.5\").    Weight as of 3/22/16: 64 kg (141 lb). .    Tiffanie Maxwell LPN    "

## 2018-05-01 NOTE — LETTER
5/1/2018         RE: Vivian De La Torre  [address] [address] [address]        Dear Colleague,    Thank you for referring your patient, Vivian De La Torre, to the [facility]. Please see a copy of my visit note below.    Vivian De La Torre is a 27 year old year old female patient here today for recheck acne vulgaris. She has finished her fifth month of isotretinoin and is currently taking 40 mg twice daily. She denies any side effects.  No mood changes. Patient has no other skin complaints today.  Remainder of the HPI, Meds, PMH, Allergies, FH, and SH was reviewed in chart.    Pertinent Hx:   Acne Vulgaris   Past Medical History:   Diagnosis Date     ASCUS on Pap smear 8/15/11    HR HPV 66 and HPV 54     Chickenpox      History of colposcopy with cervical biopsy 9/9/11    benign       Past Surgical History:   Procedure Laterality Date     NO HISTORY OF SURGERY          Family History   Problem Relation Age of Onset     Hypertension Father      HEART DISEASE Father      Respiratory Paternal Grandfather      Alcohol/Drug Maternal Grandfather        Social History     Social History     Marital status: Single     Spouse name: N/A     Number of children: N/A     Years of education: N/A     Occupational History     Not on file.     Social History Main Topics     Smoking status: Former Smoker     Packs/day: 0.10     Years: 5.00     Types: Cigarettes     Quit date: 2/1/2016     Smokeless tobacco: Never Used     Alcohol use No     Drug use: No     Sexual activity: Yes     Partners: Male     Birth control/ protection: OCP, Pill     Other Topics Concern     Not on file     Social History Narrative       Outpatient Encounter Prescriptions as of 5/1/2018   Medication Sig Dispense Refill     Etonogestrel (NEXPLANON SC)        ISOtretinoin (ACCUTANE) 40 MG capsule Take 1 capsule (40 mg) by mouth 2 times daily (with meals) 60 capsule 0     acyclovir (ZOVIRAX) 400 MG tablet Take 1 tablet (400 mg) by mouth  3 times daily Uses only for outbreaks (Patient not taking: Reported on 5/1/2018) 21 tablet 0     [DISCONTINUED] clindamycin (CLINDAMAX) 1 % lotion Apply twice daily to face. (Patient not taking: Reported on 1/9/2018) 60 mL 11     [DISCONTINUED] cyclobenzaprine (FLEXERIL) 5 MG tablet Take 1 tablet (5 mg) by mouth 3 times daily as needed for muscle spasms 42 tablet 0     [DISCONTINUED] ISOtretinoin (ACCUTANE) 40 MG capsule Take 1 capsule (40 mg) by mouth 2 times daily (with meals) 60 capsule 0     No facility-administered encounter medications on file as of 5/1/2018.              Review Of Systems  Skin: As above  Eyes: negative  Ears/Nose/Throat: negative  Respiratory: No shortness of breath, dyspnea on exertion, cough, or hemoptysis  Cardiovascular: negative  Gastrointestinal: negative  Genitourinary: negative  Musculoskeletal: negative  Neurologic: negative  Psychiatric: negative  Hematologic/Lymphatic/Immunologic: negative  Endocrine: negative      O:   NAD, WDWN, Alert & Oriented, Mood & Affect wnl, Vitals stable   Here today alone   /83  Pulse 101  SpO2 98%   General appearance normal   Vitals stable   Alert, oriented and in no acute distress     Pink macules, no visible inflammatory papules seen today      Eyes: Conjunctivae/lids:Normal     ENT: Lips: normal    MSK:Normal    Pulm: Breathing Normal    Neuro/Psych: Orientation:Normal; Mood/Affect:Normal    A/P:  1. Acne Vulgaris   Continue isotretinoin 40 mg twice daily with food.   Standing CBC, urine pregnancy, CMP and fasting lipids  Ipledge reviewed with patient and Ipledge consent form complete  Patient place in ipledge system  Contraception: 1. Nexplanon 2. Partner's vasectomy   Current Dosage: 9600 mg   Goal Dosage: 12,000 mg   Ipledge: [id number]  Return to clinic 30 days  Dry lips and mouth, minor swelling of the eyelids or lips, crusty skin, nosebleeds, GI upset, or thinning of hair may occur. If any of these effects persist or worsen, tell your  doctor or pharmacist promptly.   To relieve dry mouth, suck on (sugarless) hard candy or ice chips, chew (sugarless) gum, drink water.   Remember that your doctor has prescribed this medication because he or she has judged that the benefit to you is greater than the risk of side effects. Many people using this medication do not have serious side effects.   Contact office immediately if you have any of these unlikely but serious side effects: mental/mood changes (e.g., depression,  aggressive or violent behavior, and in rare cases, thoughts of suicide), tingling feeling in the skin, quick/severe sun sensitivity, back/joint/muscle pain, signs of infection (e.g., fever, persistent sore throat, painful swallowing, peeling skin on palms/soles.   Isotretinoin may infrequently cause disease of the pancreatitis, that may rarely be fatal. Stop taking this medication and contact office immediately if you develop: severe stomach pain severe or persistent GI upset,   Stop taking this medication and tell your doctor immediately if you develop these unlikely but very serious side effects: severe headache, vision changes, ear ringing, hearling loss, chest pain, yellowing eyes, skin, dark urine, severe diarrhea, rectal bleeding,   Seek immediate medical attention if you notice any symptoms of a serious allergic reaction.      Accutane is discussed fully with the patient. It is a very effective drug to treat acne vulgaris but has many potential significant side effects. Chief among these are teratogensis, hepatic injury, dyslipidemia and severe drying of the mucous membranes. All of these issues have been discussed in details. Monthly blood tests to monitor lipids and liver functions will be necessary. Expect painful dryness and/or fissuring around the lips, eyes, and other moist areas of the body. Balms may be protective. Contact lens may be too painful to wear temporarily while on this drug. Episodes of significant depression have  been reported, including suicidal ideation and attempts in rare cases. It may also cause pseudotumor cerebri and hyperostosis. The patient will report any such changes in mood, depressive symptoms or suicidal thoughts, headaches, joint or bone pains. There is also a possible association with inflammatory bowel disease, although this is unproven at this point.        Again, thank you for allowing me to participate in the care of your patient.        Sincerely,        Jennifer Casiano PA-C

## 2018-05-01 NOTE — MR AVS SNAPSHOT
After Visit Summary   5/1/2018    Vivian De La Torre    MRN: [id number]           Patient Information     Date Of Birth          1990        Visit Information        Provider Department      5/1/2018 3:40 PM Jennifer Hinton PA-C [facility]        Today's Diagnoses     Acne vulgaris           Follow-ups after your visit        Your next 10 appointments already scheduled     May 29, 2018  3:10 PM CDT   LAB with [facility] ([facility])    [address] [place] [address]   [phone]           Please do not eat 10-12 hours before your appointment if you are coming in fasting for labs on lipids, cholesterol, or glucose (sugar). This does not apply to pregnant women. Water, hot tea and black coffee (with nothing added) are okay. Do not drink other fluids, diet soda or chew gum.            May 29, 2018  3:40 PM CDT   Return Visit with Jennifer Hinton PA-C   [facility] ([facility]    [address] [place] [address]   [phone]              Who to contact     If you have questions or need follow up information about today's clinic visit or your schedule please contact [facility] directly at [phone].  Normal or non-critical lab and imaging results will be communicated to you by [employer], letter or phone within 4 business days after the clinic has received the results. If you do not hear from us within 7 days, please contact the clinic through MyChart or phone. If you have a critical or abnormal lab result, we will notify you by phone as soon as possible.  Submit refill requests through [employer] or call your pharmacy and they will forward the refill request to us. Please allow 3 business days for your refill to be completed.          Additional Information About Your Visit        [employer] Information     [employer] lets you send messages to your doctor, view your test  "results, renew your prescriptions, schedule appointments and more. To sign up, go to [email or web address] . Click on \"Log in\" on the left side of the screen, which will take you to the Welcome page. Then click on \"Sign up Now\" on the right side of the page.     You will be asked to enter the access code listed below, as well as some personal information. Please follow the directions to create your username and password.     Your access code is: [email or web address]  Expires: 2018  4:58 PM     Your access code will  in 90 days. If you need help or a new code, please call your [place] clinic or [phone].        Care EveryWhere ID     This is your Care EveryWhere ID. This could be used by other organizations to access your [place] medical records  [id number]        Your Vitals Were     Pulse Pulse Oximetry                101 98%           Blood Pressure from Last 3 Encounters:   18 128/83   18 121/73   18 130/80    Weight from Last 3 Encounters:   16 64 kg (141 lb)   03/15/16 64 kg (141 lb)   05/20/15 72 kg (158 lb 12.8 oz)              Today, you had the following     No orders found for display         Where to get your medicines      These medications were sent to [facility] - [address] [address]     Phone:  [phone]     ISOtretinoin 40 MG capsule          Primary Care Provider Fax #    Physician No Ref-Primary [phone]       No address on file        Equal Access to Services     [facility] : Hadgordon Johnston, waaxda luqadaha, qaybta kaalpallavi ochoa. So [facility] [phone].    ATENCIÓN: Si habla español, tiene a sykes disposición servicios gratuitos de asistencia lingüística. Llame al [phone].    We comply with applicable federal civil rights laws and [place] laws. We do not discriminate on the basis of race, color, national " origin, age, disability, sex, sexual orientation, or gender identity.            Thank you!     Thank you for choosing [facility]  for your care. Our goal is always to provide you with excellent care. Hearing back from our patients is one way we can continue to improve our services. Please take a few minutes to complete the written survey that you may receive in the mail after your visit with us. Thank you!             Your Updated Medication List - Protect others around you: Learn how to safely use, store and throw away your medicines at www.disposemymeds.org.          This list is accurate as of 5/1/18 11:59 PM.  Always use your most recent med list.                   Brand Name Dispense Instructions for use Diagnosis    acyclovir 400 MG tablet    ZOVIRAX    21 tablet    Take 1 tablet (400 mg) by mouth 3 times daily Uses only for outbreaks    HSV-2 infection       ISOtretinoin 40 MG capsule    ACCUTANE    60 capsule    Take 1 capsule (40 mg) by mouth 2 times daily (with meals)    Acne vulgaris       NEXPLANON SC

## 2018-05-03 NOTE — PROGRESS NOTES
Vivian De La Torre is a 27 year old year old female patient here today for recheck acne vulgaris. She has finished her fifth month of isotretinoin and is currently taking 40 mg twice daily. She denies any side effects.  No mood changes. Patient has no other skin complaints today.  Remainder of the HPI, Meds, PMH, Allergies, FH, and SH was reviewed in chart.    Pertinent Hx:   Acne Vulgaris   Past Medical History:   Diagnosis Date     ASCUS on Pap smear 8/15/11    HR HPV 66 and HPV 54     Chickenpox      History of colposcopy with cervical biopsy 9/9/11    benign       Past Surgical History:   Procedure Laterality Date     NO HISTORY OF SURGERY          Family History   Problem Relation Age of Onset     Hypertension Father      HEART DISEASE Father      Respiratory Paternal Grandfather      Alcohol/Drug Maternal Grandfather        Social History     Social History     Marital status: Single     Spouse name: N/A     Number of children: N/A     Years of education: N/A     Occupational History     Not on file.     Social History Main Topics     Smoking status: Former Smoker     Packs/day: 0.10     Years: 5.00     Types: Cigarettes     Quit date: 2/1/2016     Smokeless tobacco: Never Used     Alcohol use No     Drug use: No     Sexual activity: Yes     Partners: Male     Birth control/ protection: OCP, Pill     Other Topics Concern     Not on file     Social History Narrative       Outpatient Encounter Prescriptions as of 5/1/2018   Medication Sig Dispense Refill     Etonogestrel (NEXPLANON SC)        ISOtretinoin (ACCUTANE) 40 MG capsule Take 1 capsule (40 mg) by mouth 2 times daily (with meals) 60 capsule 0     acyclovir (ZOVIRAX) 400 MG tablet Take 1 tablet (400 mg) by mouth 3 times daily Uses only for outbreaks (Patient not taking: Reported on 5/1/2018) 21 tablet 0     [DISCONTINUED] clindamycin (CLINDAMAX) 1 % lotion Apply twice daily to face. (Patient not taking: Reported on 1/9/2018) 60 mL 11     [DISCONTINUED]  cyclobenzaprine (FLEXERIL) 5 MG tablet Take 1 tablet (5 mg) by mouth 3 times daily as needed for muscle spasms 42 tablet 0     [DISCONTINUED] ISOtretinoin (ACCUTANE) 40 MG capsule Take 1 capsule (40 mg) by mouth 2 times daily (with meals) 60 capsule 0     No facility-administered encounter medications on file as of 5/1/2018.              Review Of Systems  Skin: As above  Eyes: negative  Ears/Nose/Throat: negative  Respiratory: No shortness of breath, dyspnea on exertion, cough, or hemoptysis  Cardiovascular: negative  Gastrointestinal: negative  Genitourinary: negative  Musculoskeletal: negative  Neurologic: negative  Psychiatric: negative  Hematologic/Lymphatic/Immunologic: negative  Endocrine: negative      O:   NAD, WDWN, Alert & Oriented, Mood & Affect wnl, Vitals stable   Here today alone   /83  Pulse 101  SpO2 98%   General appearance normal   Vitals stable   Alert, oriented and in no acute distress     Pink macules, no visible inflammatory papules seen today      Eyes: Conjunctivae/lids:Normal     ENT: Lips: normal    MSK:Normal    Pulm: Breathing Normal    Neuro/Psych: Orientation:Normal; Mood/Affect:Normal    A/P:  1. Acne Vulgaris   Continue isotretinoin 40 mg twice daily with food.   Standing CBC, urine pregnancy, CMP and fasting lipids  Ipledge reviewed with patient and Ipledge consent form complete  Patient place in ipledge system  Contraception: 1. Nexplanon 2. Partner's vasectomy   Current Dosage: 9600 mg   Goal Dosage: 12,000 mg   Ipledge: [id number]  Return to clinic 30 days  Dry lips and mouth, minor swelling of the eyelids or lips, crusty skin, nosebleeds, GI upset, or thinning of hair may occur. If any of these effects persist or worsen, tell your doctor or pharmacist promptly.   To relieve dry mouth, suck on (sugarless) hard candy or ice chips, chew (sugarless) gum, drink water.   Remember that your doctor has prescribed this medication because he or she has judged that the benefit to  you is greater than the risk of side effects. Many people using this medication do not have serious side effects.   Contact office immediately if you have any of these unlikely but serious side effects: mental/mood changes (e.g., depression,  aggressive or violent behavior, and in rare cases, thoughts of suicide), tingling feeling in the skin, quick/severe sun sensitivity, back/joint/muscle pain, signs of infection (e.g., fever, persistent sore throat, painful swallowing, peeling skin on palms/soles.   Isotretinoin may infrequently cause disease of the pancreatitis, that may rarely be fatal. Stop taking this medication and contact office immediately if you develop: severe stomach pain severe or persistent GI upset,   Stop taking this medication and tell your doctor immediately if you develop these unlikely but very serious side effects: severe headache, vision changes, ear ringing, hearling loss, chest pain, yellowing eyes, skin, dark urine, severe diarrhea, rectal bleeding,   Seek immediate medical attention if you notice any symptoms of a serious allergic reaction.      Accutane is discussed fully with the patient. It is a very effective drug to treat acne vulgaris but has many potential significant side effects. Chief among these are teratogensis, hepatic injury, dyslipidemia and severe drying of the mucous membranes. All of these issues have been discussed in details. Monthly blood tests to monitor lipids and liver functions will be necessary. Expect painful dryness and/or fissuring around the lips, eyes, and other moist areas of the body. Balms may be protective. Contact lens may be too painful to wear temporarily while on this drug. Episodes of significant depression have been reported, including suicidal ideation and attempts in rare cases. It may also cause pseudotumor cerebri and hyperostosis. The patient will report any such changes in mood, depressive symptoms or suicidal thoughts, headaches, joint or  bone pains. There is also a possible association with inflammatory bowel disease, although this is unproven at this point.

## 2018-05-29 ENCOUNTER — OFFICE VISIT (OUTPATIENT)
Dept: DERMATOLOGY | Facility: CLINIC | Age: 28
End: 2018-05-29
Payer: COMMERCIAL

## 2018-05-29 VITALS — SYSTOLIC BLOOD PRESSURE: 125 MMHG | HEART RATE: 87 BPM | OXYGEN SATURATION: 95 % | DIASTOLIC BLOOD PRESSURE: 87 MMHG

## 2018-05-29 DIAGNOSIS — L70.0 ACNE VULGARIS: Primary | ICD-10-CM

## 2018-05-29 DIAGNOSIS — L70.0 ACNE VULGARIS: ICD-10-CM

## 2018-05-29 LAB
ALBUMIN SERPL-MCNC: 4.1 G/DL (ref 3.4–5)
ALP SERPL-CCNC: 106 U/L (ref 40–150)
ALT SERPL W P-5'-P-CCNC: 28 U/L (ref 0–50)
ANION GAP SERPL CALCULATED.3IONS-SCNC: 4 MMOL/L (ref 3–14)
AST SERPL W P-5'-P-CCNC: 24 U/L (ref 0–45)
BETA HCG QUAL IFA URINE: NEGATIVE
BILIRUB SERPL-MCNC: 0.2 MG/DL (ref 0.2–1.3)
BUN SERPL-MCNC: 14 MG/DL (ref 7–30)
CALCIUM SERPL-MCNC: 9 MG/DL (ref 8.5–10.1)
CHLORIDE SERPL-SCNC: 107 MMOL/L (ref 94–109)
CHOLEST SERPL-MCNC: 182 MG/DL
CO2 SERPL-SCNC: 28 MMOL/L (ref 20–32)
CREAT SERPL-MCNC: 0.71 MG/DL (ref 0.52–1.04)
ERYTHROCYTE [DISTWIDTH] IN BLOOD BY AUTOMATED COUNT: 11.2 % (ref 10–15)
GFR SERPL CREATININE-BSD FRML MDRD: >90 ML/MIN/1.7M2
GLUCOSE SERPL-MCNC: 75 MG/DL (ref 70–99)
HCT VFR BLD AUTO: 41 % (ref 35–47)
HDLC SERPL-MCNC: 32 MG/DL
HGB BLD-MCNC: 13.9 G/DL (ref 11.7–15.7)
LDLC SERPL CALC-MCNC: 100 MG/DL
MCH RBC QN AUTO: 32.2 PG (ref 26.5–33)
MCHC RBC AUTO-ENTMCNC: 33.9 G/DL (ref 31.5–36.5)
MCV RBC AUTO: 95 FL (ref 78–100)
NONHDLC SERPL-MCNC: 150 MG/DL
PLATELET # BLD AUTO: 216 10E9/L (ref 150–450)
POTASSIUM SERPL-SCNC: 3.7 MMOL/L (ref 3.4–5.3)
PROT SERPL-MCNC: 7.7 G/DL (ref 6.8–8.8)
RBC # BLD AUTO: 4.32 10E12/L (ref 3.8–5.2)
SODIUM SERPL-SCNC: 139 MMOL/L (ref 133–144)
TRIGL SERPL-MCNC: 249 MG/DL
WBC # BLD AUTO: 9.7 10E9/L (ref 4–11)

## 2018-05-29 PROCEDURE — 84703 CHORIONIC GONADOTROPIN ASSAY: CPT | Performed by: PHYSICIAN ASSISTANT

## 2018-05-29 PROCEDURE — 99213 OFFICE O/P EST LOW 20 MIN: CPT | Performed by: PHYSICIAN ASSISTANT

## 2018-05-29 PROCEDURE — 85027 COMPLETE CBC AUTOMATED: CPT | Performed by: PHYSICIAN ASSISTANT

## 2018-05-29 PROCEDURE — 80053 COMPREHEN METABOLIC PANEL: CPT | Performed by: PHYSICIAN ASSISTANT

## 2018-05-29 PROCEDURE — 80061 LIPID PANEL: CPT | Performed by: PHYSICIAN ASSISTANT

## 2018-05-29 PROCEDURE — 36415 COLL VENOUS BLD VENIPUNCTURE: CPT | Performed by: PHYSICIAN ASSISTANT

## 2018-05-29 RX ORDER — TRETINOIN 0.5 MG/G
CREAM TOPICAL
Qty: 45 G | Refills: 11 | Status: SHIPPED | OUTPATIENT
Start: 2018-05-29

## 2018-05-29 NOTE — LETTER
5/29/2018         RE: Vivian De La Torre  [address] [address]        Dear Colleague,    Thank you for referring your patient, Vivian De La Torre, to the [facility]. Please see a copy of my visit note below.    Vivian De La Torre is a 27 year old year old female patient here today for recheck acne vulgaris. She has finished her sixth month of isotretinoin and is currently taking 40 mg twice daily. She denies any side effects.  No mood changes.   Patient has no other skin complaints today.  Remainder of the HPI, Meds, PMH, Allergies, FH, and SH was reviewed in chart.    Pertinent Hx:   Acne Vulgaris   Past Medical History:   Diagnosis Date     ASCUS on Pap smear 8/15/11    HR HPV 66 and HPV 54     Chickenpox      History of colposcopy with cervical biopsy 9/9/11    benign       Past Surgical History:   Procedure Laterality Date     NO HISTORY OF SURGERY          Family History   Problem Relation Age of Onset     Hypertension Father      HEART DISEASE Father      Respiratory Paternal Grandfather      Alcohol/Drug Maternal Grandfather        Social History     Social History     Marital status: Single     Spouse name: N/A     Number of children: N/A     Years of education: N/A     Occupational History     Not on file.     Social History Main Topics     Smoking status: Former Smoker     Packs/day: 0.10     Years: 5.00     Types: Cigarettes     Quit date: 2/1/2016     Smokeless tobacco: Never Used     Alcohol use No     Drug use: No     Sexual activity: Yes     Partners: Male     Birth control/ protection: OCP, Pill     Other Topics Concern     Not on file     Social History Narrative       Outpatient Encounter Prescriptions as of 5/29/2018   Medication Sig Dispense Refill     acyclovir (ZOVIRAX) 400 MG tablet Take 1 tablet (400 mg) by mouth 3 times daily Uses only for outbreaks (Patient not taking: Reported on 5/1/2018) 21 tablet 0     Etonogestrel (NEXPLANON SC)        ISOtretinoin  (ACCUTANE) 40 MG capsule Take 1 capsule (40 mg) by mouth 2 times daily (with meals) 60 capsule 0     tretinoin (RETIN-A) 0.05 % cream Spread a pea size amount into affected area topically at bedtime.  Use sunscreen SPF>20. 45 g 11     No facility-administered encounter medications on file as of 5/29/2018.              Review Of Systems  Skin: As above  Eyes: negative  Ears/Nose/Throat: negative  Respiratory: No shortness of breath, dyspnea on exertion, cough, or hemoptysis  Cardiovascular: negative  Gastrointestinal: negative  Genitourinary: negative  Musculoskeletal: negative  Neurologic: negative  Psychiatric: negative  Hematologic/Lymphatic/Immunologic: negative  Endocrine: negative      O:   NAD, WDWN, Alert & Oriented, Mood & Affect wnl, Vitals stable   Here today alone   /87  Pulse 87  SpO2 95%   General appearance normal   Vitals stable   Alert, oriented and in no acute distress     Face is clear     Eyes: Conjunctivae/lids:Normal     ENT: Lips: normal    MSK:Normal    Cardiovascular: peripheral edema none    Pulm: Breathing Normal    Neuro/Psych: Orientation:Normal; Mood/Affect:Normal    A/P:  1. Acne Vulgaris- finished with isotretinoin   Finish any leftover medication.   Start tretinoin at bedtime, one month after last dosage.   Recheck urine pregnancy in one month.   Standing CBC, urine pregnancy, CMP and fasting lipids  Ipledge reviewed with patient and Ipledge consent form complete  Patient place in ipledge system  Contraception: 1. Nexplanon 2. Partner's vasectomy   Current Dosage: 12,000 mg   Goal Dosage: 12,000 mg   Ipledge: [id number]  Return to clinic 30 days  Dry lips and mouth, minor swelling of the eyelids or lips, crusty skin, nosebleeds, GI upset, or thinning of hair may occur. If any of these effects persist or worsen, tell your doctor or pharmacist promptly.   To relieve dry mouth, suck on (sugarless) hard candy or ice chips, chew (sugarless) gum, drink water.   Remember that your  doctor has prescribed this medication because he or she has judged that the benefit to you is greater than the risk of side effects. Many people using this medication do not have serious side effects.   Contact office immediately if you have any of these unlikely but serious side effects: mental/mood changes (e.g., depression,  aggressive or violent behavior, and in rare cases, thoughts of suicide), tingling feeling in the skin, quick/severe sun sensitivity, back/joint/muscle pain, signs of infection (e.g., fever, persistent sore throat, painful swallowing, peeling skin on palms/soles.   Isotretinoin may infrequently cause disease of the pancreatitis, that may rarely be fatal. Stop taking this medication and contact office immediately if you develop: severe stomach pain severe or persistent GI upset,   Stop taking this medication and tell your doctor immediately if you develop these unlikely but very serious side effects: severe headache, vision changes, ear ringing, hearling loss, chest pain, yellowing eyes, skin, dark urine, severe diarrhea, rectal bleeding,   Seek immediate medical attention if you notice any symptoms of a serious allergic reaction.      Accutane is discussed fully with the patient. It is a very effective drug to treat acne vulgaris but has many potential significant side effects. Chief among these are teratogensis, hepatic injury, dyslipidemia and severe drying of the mucous membranes. All of these issues have been discussed in details. Monthly blood tests to monitor lipids and liver functions will be necessary. Expect painful dryness and/or fissuring around the lips, eyes, and other moist areas of the body. Balms may be protective. Contact lens may be too painful to wear temporarily while on this drug. Episodes of significant depression have been reported, including suicidal ideation and attempts in rare cases. It may also cause pseudotumor cerebri and hyperostosis. The patient will report any  such changes in mood, depressive symptoms or suicidal thoughts, headaches, joint or bone pains. There is also a possible association with inflammatory bowel disease, although this is unproven at this point.     Again, thank you for allowing me to participate in the care of your patient.        Sincerely,        Jennifer Casiano PA-C

## 2018-05-29 NOTE — MR AVS SNAPSHOT
After Visit Summary   5/29/2018    Vivian De La Torre    MRN: [id number]           Patient Information     Date Of Birth          1990        Visit Information        Provider Department      5/29/2018 3:40 PM Jennifer Hinton PA-C [facility]        Today's Diagnoses     Acne vulgaris    -  1       Follow-ups after your visit        Who to contact     If you have questions or need follow up information about today's clinic visit or your schedule please contact [facility] directly at [phone].  Normal or non-critical lab and imaging results will be communicated to you by MyChart, letter or phone within 4 business days after the clinic has received the results. If you do not hear from us within 7 days, please contact the clinic through MyChart or phone. If you have a critical or abnormal lab result, we will notify you by phone as soon as possible.  Submit refill requests through [employer] or call your pharmacy and they will forward the refill request to us. Please allow 3 business days for your refill to be completed.          Additional Information About Your Visit        Care EveryWhere ID     This is your Care EveryWhere ID. This could be used by other organizations to access your [place] medical records  [id number]        Your Vitals Were     Pulse Pulse Oximetry                87 95%           Blood Pressure from Last 3 Encounters:   05/29/18 125/87   05/01/18 128/83   04/03/18 121/73    Weight from Last 3 Encounters:   03/22/16 64 kg (141 lb)   03/15/16 64 kg (141 lb)   05/20/15 72 kg (158 lb 12.8 oz)              Today, you had the following     No orders found for display         Today's Medication Changes          These changes are accurate as of 5/29/18 11:59 PM.  If you have any questions, ask your nurse or doctor.               Start taking these medicines.        Dose/Directions    tretinoin 0.05 % cream   Commonly known as:  RETIN-A   Used for:   Acne vulgaris   Started by:  Jennifer Hinton PA-C        Spread a pea size amount into affected area topically at bedtime.  Use sunscreen SPF>20.   Quantity:  45 g   Refills:  11            Where to get your medicines      These medications were sent to [facility] - [address] [address]     Phone:  [phone]     tretinoin 0.05 % cream                Primary Care Provider Fax #    Physician No Ref-Primary [phone]       No address on file        Equal Access to Services     [facility]: Hadii mandi anna hadasho Soomaali, waaxda luqadaha, qaybta kaalmada adeegyada, waxviolette de souza . So [facility] [phone].    ATENCIÓN: Si habla español, tiene a sykes disposición servicios gratuitos de asistencia lingüística. [facility] [phone].    We comply with applicable federal civil rights laws and [place] laws. We do not discriminate on the basis of race, color, national origin, age, disability, sex, sexual orientation, or gender identity.            Thank you!     Thank you for choosing [facility]  for your care. Our goal is always to provide you with excellent care. Hearing back from our patients is one way we can continue to improve our services. Please take a few minutes to complete the written survey that you may receive in the mail after your visit with us. Thank you!             Your Updated Medication List - Protect others around you: Learn how to safely use, store and throw away your medicines at www.disposemymeds.org.          This list is accurate as of 5/29/18 11:59 PM.  Always use your most recent med list.                   Brand Name Dispense Instructions for use Diagnosis    acyclovir 400 MG tablet    ZOVIRAX    21 tablet    Take 1 tablet (400 mg) by mouth 3 times daily Uses only for outbreaks    HSV-2 infection       ISOtretinoin 40 MG capsule    ACCUTANE    60 capsule    Take 1  capsule (40 mg) by mouth 2 times daily (with meals)    Acne vulgaris       NEXPLANON SC           tretinoin 0.05 % cream    RETIN-A    45 g    Spread a pea size amount into affected area topically at bedtime.  Use sunscreen SPF>20.    Acne vulgaris

## 2018-06-04 NOTE — PROGRESS NOTES
Vivian De La Torre is a 27 year old year old female patient here today for recheck acne vulgaris. She has finished her sixth month of isotretinoin and is currently taking 40 mg twice daily. She denies any side effects.  No mood changes.   Patient has no other skin complaints today.  Remainder of the HPI, Meds, PMH, Allergies, FH, and SH was reviewed in chart.    Pertinent Hx:   Acne Vulgaris   Past Medical History:   Diagnosis Date     ASCUS on Pap smear 8/15/11    HR HPV 66 and HPV 54     Chickenpox      History of colposcopy with cervical biopsy 9/9/11    benign       Past Surgical History:   Procedure Laterality Date     NO HISTORY OF SURGERY          Family History   Problem Relation Age of Onset     Hypertension Father      HEART DISEASE Father      Respiratory Paternal Grandfather      Alcohol/Drug Maternal Grandfather        Social History     Social History     Marital status: Single     Spouse name: N/A     Number of children: N/A     Years of education: N/A     Occupational History     Not on file.     Social History Main Topics     Smoking status: Former Smoker     Packs/day: 0.10     Years: 5.00     Types: Cigarettes     Quit date: 2/1/2016     Smokeless tobacco: Never Used     Alcohol use No     Drug use: No     Sexual activity: Yes     Partners: Male     Birth control/ protection: OCP, Pill     Other Topics Concern     Not on file     Social History Narrative       Outpatient Encounter Prescriptions as of 5/29/2018   Medication Sig Dispense Refill     acyclovir (ZOVIRAX) 400 MG tablet Take 1 tablet (400 mg) by mouth 3 times daily Uses only for outbreaks (Patient not taking: Reported on 5/1/2018) 21 tablet 0     Etonogestrel (NEXPLANON SC)        ISOtretinoin (ACCUTANE) 40 MG capsule Take 1 capsule (40 mg) by mouth 2 times daily (with meals) 60 capsule 0     tretinoin (RETIN-A) 0.05 % cream Spread a pea size amount into affected area topically at bedtime.  Use sunscreen SPF>20. 45 g 11     No  facility-administered encounter medications on file as of 5/29/2018.              Review Of Systems  Skin: As above  Eyes: negative  Ears/Nose/Throat: negative  Respiratory: No shortness of breath, dyspnea on exertion, cough, or hemoptysis  Cardiovascular: negative  Gastrointestinal: negative  Genitourinary: negative  Musculoskeletal: negative  Neurologic: negative  Psychiatric: negative  Hematologic/Lymphatic/Immunologic: negative  Endocrine: negative      O:   NAD, WDWN, Alert & Oriented, Mood & Affect wnl, Vitals stable   Here today alone   /87  Pulse 87  SpO2 95%   General appearance normal   Vitals stable   Alert, oriented and in no acute distress     Face is clear     Eyes: Conjunctivae/lids:Normal     ENT: Lips: normal    MSK:Normal    Cardiovascular: peripheral edema none    Pulm: Breathing Normal    Neuro/Psych: Orientation:Normal; Mood/Affect:Normal    A/P:  1. Acne Vulgaris- finished with isotretinoin   Finish any leftover medication.   Start tretinoin at bedtime, one month after last dosage.   Recheck urine pregnancy in one month.   Standing CBC, urine pregnancy, CMP and fasting lipids  Ipledge reviewed with patient and Ipledge consent form complete  Patient place in ipledge system  Contraception: 1. Nexplanon 2. Partner's vasectomy   Current Dosage: 12,000 mg   Goal Dosage: 12,000 mg   Ipledge: [id number]  Return to clinic 30 days  Dry lips and mouth, minor swelling of the eyelids or lips, crusty skin, nosebleeds, GI upset, or thinning of hair may occur. If any of these effects persist or worsen, tell your doctor or pharmacist promptly.   To relieve dry mouth, suck on (sugarless) hard candy or ice chips, chew (sugarless) gum, drink water.   Remember that your doctor has prescribed this medication because he or she has judged that the benefit to you is greater than the risk of side effects. Many people using this medication do not have serious side effects.   Contact office immediately if you  have any of these unlikely but serious side effects: mental/mood changes (e.g., depression,  aggressive or violent behavior, and in rare cases, thoughts of suicide), tingling feeling in the skin, quick/severe sun sensitivity, back/joint/muscle pain, signs of infection (e.g., fever, persistent sore throat, painful swallowing, peeling skin on palms/soles.   Isotretinoin may infrequently cause disease of the pancreatitis, that may rarely be fatal. Stop taking this medication and contact office immediately if you develop: severe stomach pain severe or persistent GI upset,   Stop taking this medication and tell your doctor immediately if you develop these unlikely but very serious side effects: severe headache, vision changes, ear ringing, hearling loss, chest pain, yellowing eyes, skin, dark urine, severe diarrhea, rectal bleeding,   Seek immediate medical attention if you notice any symptoms of a serious allergic reaction.      Accutane is discussed fully with the patient. It is a very effective drug to treat acne vulgaris but has many potential significant side effects. Chief among these are teratogensis, hepatic injury, dyslipidemia and severe drying of the mucous membranes. All of these issues have been discussed in details. Monthly blood tests to monitor lipids and liver functions will be necessary. Expect painful dryness and/or fissuring around the lips, eyes, and other moist areas of the body. Balms may be protective. Contact lens may be too painful to wear temporarily while on this drug. Episodes of significant depression have been reported, including suicidal ideation and attempts in rare cases. It may also cause pseudotumor cerebri and hyperostosis. The patient will report any such changes in mood, depressive symptoms or suicidal thoughts, headaches, joint or bone pains. There is also a possible association with inflammatory bowel disease, although this is unproven at this point.

## 2018-06-28 ENCOUNTER — OFFICE VISIT (OUTPATIENT)
Dept: FAMILY MEDICINE | Facility: CLINIC | Age: 28
End: 2018-06-28
Payer: COMMERCIAL

## 2018-06-28 DIAGNOSIS — L03.032 ACUTE PARONYCHIA OF TOE OF LEFT FOOT: Primary | ICD-10-CM

## 2018-06-28 DIAGNOSIS — Z12.4 CERVICAL CANCER SCREENING: ICD-10-CM

## 2018-06-28 LAB — BETA HCG QUAL IFA URINE: NEGATIVE

## 2018-06-28 PROCEDURE — 99213 OFFICE O/P EST LOW 20 MIN: CPT | Performed by: FAMILY MEDICINE

## 2018-06-28 PROCEDURE — G0145 SCR C/V CYTO,THINLAYER,RESCR: HCPCS | Performed by: FAMILY MEDICINE

## 2018-06-28 PROCEDURE — 84703 CHORIONIC GONADOTROPIN ASSAY: CPT | Performed by: PHYSICIAN ASSISTANT

## 2018-06-28 PROCEDURE — G0476 HPV COMBO ASSAY CA SCREEN: HCPCS | Performed by: FAMILY MEDICINE

## 2018-06-28 RX ORDER — CEPHALEXIN 500 MG/1
500 CAPSULE ORAL 4 TIMES DAILY
Qty: 40 CAPSULE | Refills: 0 | Status: SHIPPED | OUTPATIENT
Start: 2018-06-28 | End: 2018-07-08

## 2018-06-28 ASSESSMENT — MIFFLIN-ST. JEOR: SCORE: 1583.85

## 2018-06-28 NOTE — MR AVS SNAPSHOT
After Visit Summary   6/28/2018    Vivian De La Torre    MRN: [id number]           Patient Information     Date Of Birth          1990        Visit Information        Provider Department      6/28/2018 3:20 PM Júnior Mckinney MD [facility]        Today's Diagnoses     Acute paronychia of toe of left foot    -  1    Cervical cancer screening          Care Instructions      Paronychia of the Finger or Toe  Paronychia is an infection near a fingernail or toenail. It usually occurs when an opening in the cuticle or an ingrown toenail lets bacteria under the skin.  The infection will need to be drained if pus is present. If the infection has been caught early, you may need only antibiotic treatment. Healing will take about 1 to 2 weeks.  Home care  Follow these guidelines when caring for yourself at home:    Clean and soak the toe or finger. Do this 2 times a day for the first 3 days. To do so:  ? Soak your foot or hand in a tub of warm water for 5 minutes. Or hold your toe or finger under a faucet of warm running water for 5 minutes.  ? Clean any crust away with soap and water using a cotton swab.  ? Put antibiotic ointment on the infected area.    Change the dressing daily or any time it gets dirty.    If you were given antibiotics, take them as directed until they are all gone.    If your infection is on a toe, wear comfortable shoes with a lot of toe room. You can also wear open-toed sandals while your toe heals.    You may use over-the-counter medicine (acetaminophen or ibuprofen to help with pain, unless another medicine was prescribed. If you have chronic liver or kidney disease, talk with your healthcare provider before using these medicines. Also talk with your provider if you've had a stomach ulcer or GI (gastrointestinal) bleeding.  Prevention  The following can prevent paronychia:    Avoid cutting or playing with your cuticles at home.    Don't bite your nails.    Don't  suck on your thumbs or fingers.  Follow-up care  Follow up with your healthcare provider, or as advised.  When to seek medical advice  Call your healthcare provider right away if any of these occur:    Redness, pain, or swelling of the finger or toe gets worse    Red streaks in the skin leading away from the wound    Pus or fluid draining from the nail area    Fever of 100.4 F (38 C) or higher, or as directed by your provider  Date Last Reviewed: 8/1/2016 2000-2017 [employer]. [address] [place] [address]. All rights reserved. This information is not intended as a substitute for professional medical care. Always follow your healthcare professional's instructions.                Follow-ups after your visit        Your next 10 appointments already scheduled     Jun 30, 2018  9:00 AM CDT   [facility]   [facility]    [address] [place] [address] [phone]           Please do not eat 10-12 hours before your appointment if you are coming in fasting for labs on lipids, cholesterol, or glucose (sugar). This does not apply to pregnant women. Water, hot tea and black coffee (with nothing added) are okay. Do not drink other fluids, diet soda or chew gum.              Who to contact     If you have questions or need follow up information about today's clinic visit or your schedule please contact [facility] directly at [phone].  Normal or non-critical lab and imaging results will be communicated to you by MyChart, letter or phone within 4 business days after the clinic has received the results. If you do not hear from us within 7 days, please contact the clinic through MyChart or phone. If you have a critical or abnormal lab result, we will notify you by phone as soon as possible.  Submit refill requests through [employer] or call your pharmacy and they will forward the refill request to us. Please allow  3 business days for your refill to be completed.          Additional Information About Your Visit        Care EveryWhere ID     This is your Care EveryWhere ID. This could be used by other organizations to access your [facility] medical records  [id number]        Your Vitals Were     Pulse Temperature Respirations Breastfeeding? BMI (Body Mass Index)       64 97.4  F (36.3  C) (Tympanic) 18 No 32.45 kg/m2        Blood Pressure from Last 3 Encounters:   06/28/18 124/84   05/29/18 125/87   05/01/18 128/83    Weight from Last 3 Encounters:   06/28/18 192 lb (87.1 kg)   03/22/16 141 lb (64 kg)   03/15/16 141 lb (64 kg)              We Performed the Following     Beta HCG qual IFA urine          Today's Medication Changes          These changes are accurate as of 6/28/18  4:04 PM.  If you have any questions, ask your nurse or doctor.               Start taking these medicines.        Dose/Directions    cephALEXin 500 MG capsule   Commonly known as:  KEFLEX   Used for:  Acute paronychia of toe of left foot   Started by:  Júnior Mckinney MD        Dose:  500 mg   Take 1 capsule (500 mg) by mouth 4 times daily for 10 days   Quantity:  40 capsule   Refills:  0            Where to get your medicines      These medications were sent to [facility] [address]     Phone:  [phone]     cephALEXin 500 MG capsule                Primary Care Provider Fax #    Physician No Ref-Primary [phone]       No address on file        Equal Access to Services     MARTIN BAJWA AH: Amor Johnston, roger aktinson, qaybta kaalmapallavi garza. So [facility] [phone].    ATENCIÓN: Si habla español, tiene a sykes disposición servicios gratuitos de asistencia lingüística. Llame al [phone].    We comply with applicable federal civil rights laws and [place] laws. We do not discriminate on the basis  of race, color, national origin, age, disability, sex, sexual orientation, or gender identity.            Thank you!     Thank you for choosing [facility]  for your care. Our goal is always to provide you with excellent care. Hearing back from our patients is one way we can continue to improve our services. Please take a few minutes to complete the written survey that you may receive in the mail after your visit with us. Thank you!             Your Updated Medication List - Protect others around you: Learn how to safely use, store and throw away your medicines at www.disposemymeds.org.          This list is accurate as of 6/28/18  4:04 PM.  Always use your most recent med list.                   Brand Name Dispense Instructions for use Diagnosis    acyclovir 400 MG tablet    ZOVIRAX    21 tablet    Take 1 tablet (400 mg) by mouth 3 times daily Uses only for outbreaks    HSV-2 infection       cephALEXin 500 MG capsule    KEFLEX    40 capsule    Take 1 capsule (500 mg) by mouth 4 times daily for 10 days    Acute paronychia of toe of left foot       NEXPLANON SC           tretinoin 0.05 % cream    RETIN-A    45 g    Spread a pea size amount into affected area topically at bedtime.  Use sunscreen SPF>20.    Acne vulgaris

## 2018-06-28 NOTE — PROGRESS NOTES
SUBJECTIVE:   Vivian De La Torre is a 27 year old female who presents to clinic today for the following health issues:      Swollen toe      Duration: about a week     Description (location/character/radiation): Big toe, Left foot     Intensity:  moderate    Accompanying signs and symptoms: Hurts to touch, feels warm     History (similar episodes/previous evaluation): None    Precipitating or alleviating factors: Picked the toenail up and had puss come out     Therapies tried and outcome: none         Problem list and histories reviewed & adjusted, as indicated.  Additional history: as documented    Patient Active Problem List   Diagnosis     Acne     CARDIOVASCULAR SCREENING; LDL GOAL LESS THAN 160     HSV-2 infection     ASCUS on Pap smear     Cervical high risk HPV (human papillomavirus) test positive     Contraception     Past Surgical History:   Procedure Laterality Date     NO HISTORY OF SURGERY         Social History   Substance Use Topics     Smoking status: Former Smoker     Packs/day: 0.10     Years: 5.00     Types: Cigarettes     Quit date: 2/1/2016     Smokeless tobacco: Never Used     Alcohol use No     Family History   Problem Relation Age of Onset     Hypertension Father      HEART DISEASE Father      Respiratory Paternal Grandfather      Alcohol/Drug Maternal Grandfather          Current Outpatient Prescriptions   Medication Sig Dispense Refill     Etonogestrel (NEXPLANON SC)        tretinoin (RETIN-A) 0.05 % cream Spread a pea size amount into affected area topically at bedtime.  Use sunscreen SPF>20. 45 g 11     acyclovir (ZOVIRAX) 400 MG tablet Take 1 tablet (400 mg) by mouth 3 times daily Uses only for outbreaks (Patient not taking: Reported on 5/1/2018) 21 tablet 0     Allergies   Allergen Reactions     Chantix [Varenicline Tartrate]      Mold      Hives       Recent Labs   Lab Test  05/29/18   1528  05/01/18   1442  04/03/18   1508   01/03/12   1421   LDL  100*  101*  130*   < >   --    HDL   32*  32*  38*   < >   --    TRIG  249*  212*  120   < >   --    ALT  28  25  35   < >   --    CR  0.71  0.68  0.66   < >   --    GFRESTIMATED  >90  >90  >90   < >   --    GFRESTBLACK  >90  >90  >90   < >   --    POTASSIUM  3.7  3.4  3.6   < >   --    TSH   --    --    --    --   0.52    < > = values in this interval not displayed.      BP Readings from Last 3 Encounters:   06/28/18 124/84   05/29/18 125/87   05/01/18 128/83    Wt Readings from Last 3 Encounters:   06/28/18 192 lb (87.1 kg)   03/22/16 141 lb (64 kg)   03/15/16 141 lb (64 kg)                  Labs reviewed in EPIC    Reviewed and updated as needed this visit by clinical staff       Reviewed and updated as needed this visit by Provider         ROS:  Constitutional, HEENT, cardiovascular, pulmonary, gi and gu systems are negative, except as otherwise noted.    OBJECTIVE:     /84 (Cuff Size: Adult Regular)  Pulse 64  Temp 97.4  F (36.3  C) (Tympanic)  Resp 18  Wt 192 lb (87.1 kg)  Breastfeeding? No  BMI 32.45 kg/m2  Body mass index is 32.45 kg/(m^2).  GENERAL: healthy, alert and no distress  EYES: Eyes grossly normal to inspection, PERRL and conjunctivae and sclerae normal  NECK: no adenopathy, no asymmetry, masses, or scars and thyroid normal to palpation  RESP: lungs clear to auscultation - no rales, rhonchi or wheezes   (female): normal female external genitalia, normal urethral meatus , vaginal mucosa pink, moist, well rugated, normal cervix, adnexae, and uterus without masses and Pap smear obtained  MS: no gross musculoskeletal defects noted, no edema  NEURO: Normal strength and tone, mentation intact and speech normal  SKIN: erythematous lateral great toe nail fold without any significant fluctuance, tender on palpation              ASSESSMENT/PLAN:       1. Acute paronychia of toe of left foot  -Suspect left great toe lesion secondary to acute paronychia, cephalexin prescribed, common side effect discussed, home care explained  including soaking, applying bacitracin, will consider I&D if swelling persists or worsens  - cephALEXin (KEFLEX) 500 MG capsule; Take 1 capsule (500 mg) by mouth 4 times daily for 10 days  Dispense: 40 capsule; Refill: 0      3. Cervical cancer screening  -History of ASCUS, in 2011, subsequent Pap smears negative  -Cervical Pap smear obtained, will be informed of results once available      Patient Instructions     Paronychia of the Finger or Toe  Paronychia is an infection near a fingernail or toenail. It usually occurs when an opening in the cuticle or an ingrown toenail lets bacteria under the skin.  The infection will need to be drained if pus is present. If the infection has been caught early, you may need only antibiotic treatment. Healing will take about 1 to 2 weeks.  Home care  Follow these guidelines when caring for yourself at home:    Clean and soak the toe or finger. Do this 2 times a day for the first 3 days. To do so:  ? Soak your foot or hand in a tub of warm water for 5 minutes. Or hold your toe or finger under a faucet of warm running water for 5 minutes.  ? Clean any crust away with soap and water using a cotton swab.  ? Put antibiotic ointment on the infected area.    Change the dressing daily or any time it gets dirty.    If you were given antibiotics, take them as directed until they are all gone.    If your infection is on a toe, wear comfortable shoes with a lot of toe room. You can also wear open-toed sandals while your toe heals.    You may use over-the-counter medicine (acetaminophen or ibuprofen to help with pain, unless another medicine was prescribed. If you have chronic liver or kidney disease, talk with your healthcare provider before using these medicines. Also talk with your provider if you've had a stomach ulcer or GI (gastrointestinal) bleeding.  Prevention  The following can prevent paronychia:    Avoid cutting or playing with your cuticles at home.    Don't bite your  nails.    Don't suck on your thumbs or fingers.  Follow-up care  Follow up with your healthcare provider, or as advised.  When to seek medical advice  Call your healthcare provider right away if any of these occur:    Redness, pain, or swelling of the finger or toe gets worse    Red streaks in the skin leading away from the wound    Pus or fluid draining from the nail area    Fever of 100.4 F (38 C) or higher, or as directed by your provider  Date Last Reviewed: 8/1/2016 2000-2017 [employer]. [address] [place], [place] [id number]. All rights reserved. This information is not intended as a substitute for professional medical care. Always follow your healthcare professional's instructions.            Júnior Mckinney MD  [facility]

## 2018-06-28 NOTE — NURSING NOTE
"Chief Complaint   Patient presents with     Toe Pain       Initial /84 (Cuff Size: Adult Regular)  Pulse 64  Temp 97.4  F (36.3  C) (Tympanic)  Resp 18  Wt 192 lb (87.1 kg)  Breastfeeding? No  BMI 32.45 kg/m2 Estimated body mass index is 32.45 kg/(m^2) as calculated from the following:    Height as of 3/15/16: 5' 4.5\" (1.638 m).    Weight as of this encounter: 192 lb (87.1 kg).      Health Maintenance that is potentially due pending provider review:  NONE    n/a    Is there anyone who you would like to be able to receive your results? Not Applicable  If yes have patient fill out AWILDA    "

## 2018-06-28 NOTE — PATIENT INSTRUCTIONS
Paronychia of the Finger or Toe  Paronychia is an infection near a fingernail or toenail. It usually occurs when an opening in the cuticle or an ingrown toenail lets bacteria under the skin.  The infection will need to be drained if pus is present. If the infection has been caught early, you may need only antibiotic treatment. Healing will take about 1 to 2 weeks.  Home care  Follow these guidelines when caring for yourself at home:    Clean and soak the toe or finger. Do this 2 times a day for the first 3 days. To do so:  ? Soak your foot or hand in a tub of warm water for 5 minutes. Or hold your toe or finger under a faucet of warm running water for 5 minutes.  ? Clean any crust away with soap and water using a cotton swab.  ? Put antibiotic ointment on the infected area.    Change the dressing daily or any time it gets dirty.    If you were given antibiotics, take them as directed until they are all gone.    If your infection is on a toe, wear comfortable shoes with a lot of toe room. You can also wear open-toed sandals while your toe heals.    You may use over-the-counter medicine (acetaminophen or ibuprofen to help with pain, unless another medicine was prescribed. If you have chronic liver or kidney disease, talk with your healthcare provider before using these medicines. Also talk with your provider if you've had a stomach ulcer or GI (gastrointestinal) bleeding.  Prevention  The following can prevent paronychia:    Avoid cutting or playing with your cuticles at home.    Don't bite your nails.    Don't suck on your thumbs or fingers.  Follow-up care  Follow up with your healthcare provider, or as advised.  When to seek medical advice  Call your healthcare provider right away if any of these occur:    Redness, pain, or swelling of the finger or toe gets worse    Red streaks in the skin leading away from the wound    Pus or fluid draining from the nail area    Fever of 100.4 F (38 C) or higher, or as directed  by your provider  Date Last Reviewed: 8/1/2016 2000-2017 [employer]. [address] [place] [address], [place] [address]. All rights reserved. This information is not intended as a substitute for professional medical care. Always follow your healthcare professional's instructions.

## 2018-06-28 NOTE — LETTER
July 10, 2018    Vivian De La Torre  [address] [address] [facility] [address]    Dear Vivian,  We are happy to inform you that your PAP smear result from 6/28/18 is normal.  We are now able to do a follow up test on PAP smears. The DNA test is for HPV (Human Papilloma Virus). Cervical cancer is closely linked with certain types of HPV. Your results showed no evidence of high risk HPV.  Therefore we recommend you return in 3 years for your next pap smear.  You will still need to return to the clinic every year for an annual exam and other preventive tests.  Please contact the clinic at [phone] with any questions.  Sincerely,    Júnior Mckinney MD/[facility]

## 2018-07-02 LAB
COPATH REPORT: NORMAL
PAP: NORMAL

## 2018-07-05 LAB
FINAL DIAGNOSIS: NORMAL
HPV HR 12 DNA CVX QL NAA+PROBE: NEGATIVE
HPV16 DNA SPEC QL NAA+PROBE: NEGATIVE
HPV18 DNA SPEC QL NAA+PROBE: NEGATIVE
SPECIMEN DESCRIPTION: NORMAL
SPECIMEN SOURCE CVX/VAG CYTO: NORMAL

## 2019-01-03 DIAGNOSIS — B00.9 HSV-2 INFECTION: ICD-10-CM

## 2019-01-03 RX ORDER — ACYCLOVIR 400 MG/1
TABLET ORAL
Qty: 21 TABLET | Refills: 0 | Status: SHIPPED | OUTPATIENT
Start: 2019-01-03 | End: 2019-01-24

## 2019-01-03 NOTE — TELEPHONE ENCOUNTER
"Requested Prescriptions   Pending Prescriptions Disp Refills     acyclovir (ZOVIRAX) 400 MG tablet [Pharmacy Med Name: ACYCLOVIR 400 MG TABLET] 21 tablet 0     Sig: Take 1 tablet (400 mg) by mouth 3 times daily Uses only for outbreaks    Antivirals for Herpes Protocol Passed - 1/3/2019  8:10 AM       Passed - Patient is age 12 or older       Passed - Recent (12 mo) or future (30 days) visit within the authorizing provider's specialty    Patient had office visit in the last 12 months or has a visit in the next 30 days with authorizing provider or within the authorizing provider's specialty.  See \"Patient Info\" tab in inbasket, or \"Choose Columns\" in Meds & Orders section of the refill encounter.      Last Written Prescription Date:  2/8/18  Last Fill Quantity: 21,  # refills: 0   Last office visit: 6/28/2018 with prescribing provider:     Future Office Visit:               Passed - Normal serum creatinine on file in past 12 months    Recent Labs   Lab Test 05/29/18  1528   CR 0.71               "

## 2019-01-24 DIAGNOSIS — B00.9 HSV-2 INFECTION: ICD-10-CM

## 2019-01-24 RX ORDER — ACYCLOVIR 400 MG/1
TABLET ORAL
Qty: 21 TABLET | Refills: 0 | Status: SHIPPED | OUTPATIENT
Start: 2019-01-24

## 2019-01-24 NOTE — TELEPHONE ENCOUNTER
"Prescription approved per [facility] Refill Protocol.    Called pt said she did  last Rx sent 1/3/19 and used them as she did have an outbreak at that time. She does not have another outbreak now, says she just wants to have them on hand for a future outbreak, instead of waiting a couple days.    Requested Prescriptions   Pending Prescriptions Disp Refills     acyclovir (ZOVIRAX) 400 MG tablet [Pharmacy Med Name: ACYCLOVIR 400 MG TABLET] 21 tablet 0     Sig: Take 1 tablet (400 mg) by mouth 3 times daily Uses only for outbreaks    Antivirals for Herpes Protocol Passed - 1/24/2019  2:14 PM       Passed - Patient is age 12 or older       Passed - Recent (12 mo) or future (30 days) visit within the authorizing provider's specialty    Patient had office visit in the last 12 months or has a visit in the next 30 days with authorizing provider or within the authorizing provider's specialty.  See \"Patient Info\" tab in inbasket, or \"Choose Columns\" in Meds & Orders section of the refill encounter.             Passed - Medication is active on med list       Passed - Normal serum creatinine on file in past 12 months    Recent Labs   Lab Test 05/29/18  1528   CR 0.71             Last Written Prescription Date:  1/3/19  Last Fill Quantity: 21,  # refills: 0   Last office visit: 6/28/2018 with Dr. Mckinney  Future Office Visit:      Mervat TITUS RN      "

## 2019-04-29 VITALS
HEART RATE: 64 BPM | DIASTOLIC BLOOD PRESSURE: 84 MMHG | HEIGHT: 65 IN | BODY MASS INDEX: 31.99 KG/M2 | SYSTOLIC BLOOD PRESSURE: 124 MMHG | RESPIRATION RATE: 18 BRPM | WEIGHT: 192 LBS | TEMPERATURE: 97.4 F

## 2021-10-28 NOTE — TELEPHONE ENCOUNTER
acyclovir (ZOVIRAX) 400 MG tablet (Discontinued)     Last Written Prescription Date: 07/14/2016  Last Fill Quantity: 21 tablet, # refills: 6  Last Office Visit with [facility], [facility] or [facility] prescribing provider: 03/22/2016   Next 5 appointments (look out 90 days)     Nov 07, 2017  4:20 PM CST   Return Visit with Jennifer Hinton PA-C   [facility] ([facility])    [address] [id number] [phone]                   Creatinine   Date Value Ref Range Status   08/08/2017 0.70 0.52 - 1.04 mg/dL Final        History of a stroke or TIA

## 2023-09-20 NOTE — LETTER
2/6/2018         RE: Vivian De La Torre  [address] [id number]        Dear Colleague,    Thank you for referring your patient, Vivian De La Torre, to the [facility]. Please see a copy of my visit note below.    Vivian De La Torre is a 27 year old year old female patient here today for recheck acne vulgaris.  Patient finished second month, she is currently on 30 mg twice daily with food. She denies any side effects except for dry skin. She denies any mood changes. She hasn't noticed much improvement with skin yet. Patient has no other skin complaints today.  Remainder of the HPI, Meds, PMH, Allergies, FH, and SH was reviewed in chart.    Pertinent Hx:   Acne vulgaris   Past Medical History:   Diagnosis Date     ASCUS on Pap smear 8/15/11    HR HPV 66 and HPV 54     Chickenpox      History of colposcopy with cervical biopsy 9/9/11    benign       Past Surgical History:   Procedure Laterality Date     NO HISTORY OF SURGERY          Family History   Problem Relation Age of Onset     Hypertension Father      HEART DISEASE Father      Respiratory Paternal Grandfather      Alcohol/Drug Maternal Grandfather        Social History     Social History     Marital status: Single     Spouse name: N/A     Number of children: N/A     Years of education: N/A     Occupational History     Not on file.     Social History Main Topics     Smoking status: Current Every Day Smoker     Packs/day: 0.10     Last attempt to quit: 2/1/2013     Smokeless tobacco: Never Used      Comment: trying to quit again- 5-20-15     Alcohol use No     Drug use: No     Sexual activity: Yes     Partners: Male     Birth control/ protection: OCP, Pill     Other Topics Concern     Not on file     Social History Narrative       Outpatient Encounter Prescriptions as of 2/6/2018   Medication Sig Dispense Refill     ISOtretinoin 30 MG CAPS Take 1 capsule by mouth 2 times daily (with meals) 60 capsule 0     Etonogestrel (NEXPLANON  Prep Survey      Flowsheet Row Responses   [facility] facility patient discharged from? Non-[facility]   Is LACE score < 7 ? Non-BH Discharge   Eligibility [facility]   Date of Admission 09/12/23   Date of Discharge 09/19/23   Discharge diagnosis Hypoglycemia   Does the patient have one of the following disease processes/diagnoses(primary or secondary)? Other   Prep survey completed? Yes            Susanna LANDA - Registered Nurse           SC)        cyclobenzaprine (FLEXERIL) 5 MG tablet Take 1 tablet (5 mg) by mouth 3 times daily as needed for muscle spasms 42 tablet 0     [DISCONTINUED] ISOtretinoin 30 MG CAPS Take 1 capsule by mouth 2 times daily (with meals) 60 capsule 0     acyclovir (ZOVIRAX) 400 MG tablet Take 1 tablet (400 mg) by mouth 3 times daily Uses only for outbreaks (Patient not taking: Reported on 1/9/2018) 21 tablet 0     clindamycin (CLINDAMAX) 1 % lotion Apply twice daily to face. (Patient not taking: Reported on 1/9/2018) 60 mL 11     No facility-administered encounter medications on file as of 2/6/2018.              Review Of Systems  Skin: As above  Eyes: negative  Ears/Nose/Throat: negative  Respiratory: No shortness of breath, dyspnea on exertion, cough, or hemoptysis  Cardiovascular: negative  Gastrointestinal: negative  Genitourinary: negative  Musculoskeletal: negative  Neurologic: negative  Psychiatric: negative  Hematologic/Lymphatic/Immunologic: negative  Endocrine: negative      O:   NAD, WDWN, Alert & Oriented, Mood & Affect wnl, Vitals stable   Here today alone   /85  Pulse 92  SpO2 98%   General appearance normal   Vitals stable   Alert, oriented and in no acute distress     2+ inflammatory papules and comedones on face       Eyes: Conjunctivae/lids:Normal     ENT: Lips: normal    MSK:Normal    Pulm: Breathing Normal    Neuro/Psych: Orientation:Normal; Mood/Affect:Normal  A/P:  1. Acne Vulgaris   Continue isotretinoin 30 mg twice daily with food.   Standing CBC, urine pregnancy, CMP and fasting lipids  Ipledge reviewed with patient and Ipledge consent form complete  Patient place in ipledge system  Contraception: 1. Nexplanon 2. Partner's vasectomy   Current Dosage: 1800 mg   Ipledge: [id number]  Return to clinic 30 days  Dry lips and mouth, minor swelling of the eyelids or lips, crusty skin, nosebleeds, GI upset, or thinning of hair may occur. If any of these effects persist or worsen, tell your doctor or  pharmacist promptly.   To relieve dry mouth, suck on (sugarless) hard candy or ice chips, chew (sugarless) gum, drink water.   Remember that your doctor has prescribed this medication because he or she has judged that the benefit to you is greater than the risk of side effects. Many people using this medication do not have serious side effects.   Contact office immediately if you have any of these unlikely but serious side effects: mental/mood changes (e.g., depression,  aggressive or violent behavior, and in rare cases, thoughts of suicide), tingling feeling in the skin, quick/severe sun sensitivity, back/joint/muscle pain, signs of infection (e.g., fever, persistent sore throat, painful swallowing, peeling skin on palms/soles.   Isotretinoin may infrequently cause disease of the pancreatitis, that may rarely be fatal. Stop taking this medication and contact office immediately if you develop: severe stomach pain severe or persistent GI upset,   Stop taking this medication and tell your doctor immediately if you develop these unlikely but very serious side effects: severe headache, vision changes, ear ringing, hearling loss, chest pain, yellowing eyes, skin, dark urine, severe diarrhea, rectal bleeding,   Seek immediate medical attention if you notice any symptoms of a serious allergic reaction.      Accutane is discussed fully with the patient. It is a very effective drug to treat acne vulgaris but has many potential significant side effects. Chief among these are teratogensis, hepatic injury, dyslipidemia and severe drying of the mucous membranes. All of these issues have been discussed in details. Monthly blood tests to monitor lipids and liver functions will be necessary. Expect painful dryness and/or fissuring around the lips, eyes, and other moist areas of the body. Balms may be protective. Contact lens may be too painful to wear temporarily while on this drug. Episodes of significant depression have been  reported, including suicidal ideation and attempts in rare cases. It may also cause pseudotumor cerebri and hyperostosis. The patient will report any such changes in mood, depressive symptoms or suicidal thoughts, headaches, joint or bone pains. There is also a possible association with inflammatory bowel disease, although this is unproven at this point.        Again, thank you for allowing me to participate in the care of your patient.        Sincerely,        Jennifer Casiano PA-C